# Patient Record
Sex: FEMALE | Race: WHITE | NOT HISPANIC OR LATINO | Employment: FULL TIME | URBAN - METROPOLITAN AREA
[De-identification: names, ages, dates, MRNs, and addresses within clinical notes are randomized per-mention and may not be internally consistent; named-entity substitution may affect disease eponyms.]

---

## 2017-06-08 ENCOUNTER — GENERIC CONVERSION - ENCOUNTER (OUTPATIENT)
Dept: OTHER | Facility: OTHER | Age: 37
End: 2017-06-08

## 2017-06-08 ENCOUNTER — ALLSCRIPTS OFFICE VISIT (OUTPATIENT)
Dept: OTHER | Facility: OTHER | Age: 37
End: 2017-06-08

## 2017-06-08 LAB — S PYO AG THROAT QL: NEGATIVE

## 2018-01-15 VITALS
SYSTOLIC BLOOD PRESSURE: 94 MMHG | DIASTOLIC BLOOD PRESSURE: 72 MMHG | WEIGHT: 114 LBS | BODY MASS INDEX: 18.99 KG/M2 | HEIGHT: 65 IN | RESPIRATION RATE: 16 BRPM | TEMPERATURE: 99.6 F | HEART RATE: 88 BPM

## 2018-01-15 NOTE — RESULT NOTES
Verified Results  Rapid Pia Keegan- POC [id number] 10:39AM Hortensia Sandoval     Test Name Result Flag Reference   Rapid Strep Negative

## 2018-01-16 NOTE — MISCELLANEOUS
History of Present Illness  TCM Communication [facility] Diana [facility]: She was hospitalized at [facility]  The date of discharge:, September 26, 2016  Diagnosis: Pre-term contractions  She was discharged to home  She did not schedule a follow up appointment  Follow-up appointments with other specialists: Will f/u with ob/gyn  Counseling was provided to the patient  Topics counseled included importance of compliance with treatment  Communication performed and completed by Berry Lazaro      Active Problems    1  Abdominal pain (789 00) (R10 9)   2  Abdominal pain, acute, right lower quadrant (789 03,338 19) (R10 31)   3  Abrasion Or Friction Burn (919 0)   4  Acute bronchitis (466 0) (J20 9)   5  Acute upper respiratory infection (465 9) (J06 9)   6  Allergic rhinitis (477 9) (J30 9)   7  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)   8  History of allergy (V15 09) (Z88 9)   9  Ingrowing nail with infection (703 0) (L60 0)   10  Insomnia (780 52) (G47 00)   11  Lower back pain (724 2) (M54 5)   12  Lumbar radiculopathy (724 4) (M54 16)   13  Nasal pain (478 19) (J34 89)   14  Reactive airway disease (493 90) (J45 909)   15  Skin lesion (709 9) (L98 9)    Past Medical History    1  History of Denial Of Any Significant Medical History    Surgical History    1  History of Myringotomy    Family History  Father    1  Family history of Coronary Artery Disease (V18 0)  Family History    2  Denied: Family history of Breast Cancer   3  Denied: Family history of Colon Cancer   4  Denied: Family history of Diabetes Mellitus   5  Denied: Family history of Stroke Syndrome    Social History    · Being A Social Drinker   · Caffeine Use   · Never A Smoker    Current Meds   1  Guaifenesin-Codeine 100-10 MG/5ML SYRP; TAKE 5 - 10 ML EVERY 4 TO 6 HOURS AS   NEEDED FOR COUGH; Therapy: 70Fsd3253 to (Evaluate:89Xko5132); Last Rx:[id number] Ordered   2  Levonorgestrel-Ethinyl Estrad 0 15-30 MG-MCG Oral Tablet; TAKE 1 TABLET DAILY;    Therapy: (Kiki Ast) to Recorded   3  Zithromax Z-Leo 250 MG Oral Tablet; TAKE 2 TABLETS ON DAY 1 THEN TAKE 1 TABLET   A DAY FOR 4 DAYS; Therapy: 66Vbt0326 to (Last Rx:[id number])  Requested for: 82Ocy8367 Ordered    Allergies    1   No Known Drug Allergies    Signatures   Electronically signed by : Sj Ashraf DO; Oct  7 2016  3:26PM EST                       (Author)

## 2018-01-18 NOTE — MISCELLANEOUS
History of Present Illness  TCM Communication St Rene Fang: She was hospitalized at [facility]  The date of discharge:, October 17, 2016  Diagnosis: Premature rupture of membranes/pregnancy  She was discharged to home  She did not schedule a follow up appointment  Follow-up appointments with other specialists: will f/u with ob/gyn  Counseling was provided to the patient  Topics counseled included importance of compliance with treatment  Communication performed and completed by Tonie Cabrerao      Active Problems   1  Abdominal pain (789 00) (R10 9)  2  Abdominal pain, acute, right lower quadrant (789 03,338 19) (R10 31)  3  Abrasion Or Friction Burn (919 0)  4  Acute bronchitis (466 0) (J20 9)  5  Acute upper respiratory infection (465 9) (J06 9)  6  Allergic rhinitis (477 9) (J30 9)  7  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)  8  History of allergy (V15 09) (Z88 9)  9  Ingrowing nail with infection (703 0) (L60 0)  10  Insomnia (780 52) (G47 00)  11  Lower back pain (724 2) (M54 5)  12  Lumbar radiculopathy (724 4) (M54 16)  13  Nasal pain (478 19) (J34 89)  14  Reactive airway disease (493 90) (J45 909)  15  Skin lesion (709 9) (L98 9)    Past Medical History   1  History of Denial Of Any Significant Medical History    Surgical History   1  History of Myringotomy    Family History  Father   1  Family history of Coronary Artery Disease (V18 0)  Family History   2  Denied: Family history of Breast Cancer  3  Denied: Family history of Colon Cancer  4  Denied: Family history of Diabetes Mellitus  5  Denied: Family history of Stroke Syndrome    Social History    · Being A Social Drinker   · Caffeine Use   · Never A Smoker    Current Meds  1  Guaifenesin-Codeine 100-10 MG/5ML SYRP; TAKE 5 - 10 ML EVERY 4 TO 6 HOURS AS   NEEDED FOR COUGH; Therapy: 01Kvk3619 to (Evaluate:12Fsh1149); Last Rx:[id number] Ordered  2  Levonorgestrel-Ethinyl Estrad 0 15-30 MG-MCG Oral Tablet; TAKE 1 TABLET DAILY;    Therapy: (Roxine Rily) to Recorded  3  Zithromax Z-Leo 250 MG Oral Tablet; TAKE 2 TABLETS ON DAY 1 THEN TAKE 1 TABLET   A DAY FOR 4 DAYS; Therapy: 09Rha1682 to (Last Rx:27Zuc4670)  Requested for: 16Lfa7602 Ordered    Allergies   1   No Known Drug Allergies    Signatures   Electronically signed by : Rondel Baumgarten, DO; Oct 26 2016  2:27PM EST                       (Author)    Electronically signed by : Rondel Baumgarten, DO; Oct 26 2016  2:27PM EST                       (Author)

## 2018-05-11 ENCOUNTER — OFFICE VISIT (OUTPATIENT)
Dept: FAMILY MEDICINE CLINIC | Facility: CLINIC | Age: 38
End: 2018-05-11
Payer: COMMERCIAL

## 2018-05-11 VITALS
RESPIRATION RATE: 16 BRPM | SYSTOLIC BLOOD PRESSURE: 110 MMHG | TEMPERATURE: 98.7 F | BODY MASS INDEX: 19.99 KG/M2 | HEIGHT: 65 IN | WEIGHT: 120 LBS | HEART RATE: 68 BPM | DIASTOLIC BLOOD PRESSURE: 64 MMHG

## 2018-05-11 DIAGNOSIS — G47.00 INSOMNIA, UNSPECIFIED TYPE: Primary | ICD-10-CM

## 2018-05-11 PROCEDURE — 3008F BODY MASS INDEX DOCD: CPT | Performed by: FAMILY MEDICINE

## 2018-05-11 PROCEDURE — 99213 OFFICE O/P EST LOW 20 MIN: CPT | Performed by: FAMILY MEDICINE

## 2018-05-11 RX ORDER — ZOLPIDEM TARTRATE 10 MG/1
10 TABLET ORAL
Qty: 30 TABLET | Refills: 2 | Status: SHIPPED | OUTPATIENT
Start: 2018-05-11 | End: 2018-08-14 | Stop reason: SDUPTHER

## 2018-05-11 RX ORDER — LEVONORGESTREL AND ETHINYL ESTRADIOL 0.15-0.03
1 KIT ORAL
COMMUNITY
Start: 2018-04-30 | End: 2020-11-25 | Stop reason: ALTCHOICE

## 2018-05-11 NOTE — PROGRESS NOTES
Assessment/Plan:    Discussed treatment options with patient and patient prefers to continue with Ambien  Prescription written for Ambien 10 mg q h s  p r n     Discussed proper sleep hygiene  Avoid caffeine after noon  Recommend regular exercise but not within a couple hours prior to going to bed  Recommend patient avoid cell phone and computer for couple hours before going to bed  Return to the office p r n  Mariah Leroy Diagnoses and all orders for this visit:    Insomnia, unspecified type  Comments:  Zolpidem 10 mg q h s  p r n     Avoid caffeine after noon  Recommend regular exercise  Discussed proper sleep hygiene  Orders:  -     zolpidem (AMBIEN) 10 mg tablet; Take 1 tablet (10 mg total) by mouth daily at bedtime as needed for sleep    Other orders  -     levonorgestrel-ethinyl estradiol (SEASONALE) 0 15-0 03 MG per tablet; Take 1 tablet by mouth          Subjective:      Patient ID: Nasim Jarvis is a 40 y o  female  Patient complains of difficulty sleeping chronically for years  She complains of difficulty falling asleep and staying asleep  Patient admits to increased stress in her life with working, caring for 4 children and planning a wedding  Patient drinks 1 cup of coffee in the morning only  No regular exercise program   Patient has tried several over-the-counter sleep aids including melatonin, Tylenol p m  and Advil p m  without improvement  Patient has taken her mother's Ambien on occasion with good results and denies side effects          The following portions of the patient's history were reviewed and updated as appropriate: allergies, current medications, past family history, past medical history, past social history, past surgical history and problem list     Review of Systems      Objective:      /64   Pulse 68   Temp 98 7 °F (37 1 °C)   Resp 16   Ht 5' 5" (1 651 m)   Wt 54 4 kg (120 lb)   BMI 19 97 kg/m²          Physical Exam   Constitutional: She is oriented to person, place, and time  She appears well-developed and well-nourished  No distress  HENT:   Head: Normocephalic  Mouth/Throat: Oropharynx is clear and moist    Eyes: Conjunctivae are normal  No scleral icterus  Neck: Neck supple  No thyromegaly present  Cardiovascular: Normal rate and regular rhythm  Pulmonary/Chest: Effort normal and breath sounds normal    Abdominal: Soft  There is no tenderness  Musculoskeletal: She exhibits no edema  Lymphadenopathy:     She has no cervical adenopathy  Neurological: She is alert and oriented to person, place, and time  Skin: Skin is warm and dry  Psychiatric: She has a normal mood and affect

## 2018-08-14 DIAGNOSIS — G47.00 INSOMNIA, UNSPECIFIED TYPE: ICD-10-CM

## 2018-08-14 RX ORDER — ZOLPIDEM TARTRATE 10 MG/1
10 TABLET ORAL
Qty: 30 TABLET | Refills: 0 | Status: SHIPPED | OUTPATIENT
Start: 2018-08-14 | End: 2018-09-13 | Stop reason: SDUPTHER

## 2018-08-17 NOTE — TELEPHONE ENCOUNTER
Spoke with Carlos at [facility], called in prescription and put printed copy in shred

## 2018-09-04 ENCOUNTER — OFFICE VISIT (OUTPATIENT)
Dept: FAMILY MEDICINE CLINIC | Facility: CLINIC | Age: 38
End: 2018-09-04
Payer: COMMERCIAL

## 2018-09-04 VITALS
DIASTOLIC BLOOD PRESSURE: 60 MMHG | TEMPERATURE: 98.8 F | RESPIRATION RATE: 16 BRPM | HEART RATE: 72 BPM | BODY MASS INDEX: 19.97 KG/M2 | WEIGHT: 120 LBS | SYSTOLIC BLOOD PRESSURE: 104 MMHG

## 2018-09-04 DIAGNOSIS — R23.8 EASY BRUISING: Primary | ICD-10-CM

## 2018-09-04 LAB
ERYTHROCYTE [DISTWIDTH] IN BLOOD BY AUTOMATED COUNT: 12 % (ref 11.6–15.1)
HCT VFR BLD AUTO: 40.3 % (ref 34.8–46.1)
HGB BLD-MCNC: 13.7 G/DL (ref 11.5–15.4)
INR PPP: 0.9 (ref 0.86–1.17)
MCH RBC QN AUTO: 32.9 PG (ref 26.8–34.3)
MCHC RBC AUTO-ENTMCNC: 34 G/DL (ref 31.4–37.4)
MCV RBC AUTO: 97 FL (ref 82–98)
PLATELET # BLD AUTO: 210 THOUSANDS/UL (ref 149–390)
PMV BLD AUTO: 10.8 FL (ref 8.9–12.7)
PROTHROMBIN TIME: 12.3 SECONDS (ref 11.8–14.2)
RBC # BLD AUTO: 4.16 MILLION/UL (ref 3.81–5.12)
WBC # BLD AUTO: 8.09 THOUSAND/UL (ref 4.31–10.16)

## 2018-09-04 PROCEDURE — 85610 PROTHROMBIN TIME: CPT | Performed by: FAMILY MEDICINE

## 2018-09-04 PROCEDURE — 85027 COMPLETE CBC AUTOMATED: CPT | Performed by: FAMILY MEDICINE

## 2018-09-04 PROCEDURE — 1036F TOBACCO NON-USER: CPT | Performed by: FAMILY MEDICINE

## 2018-09-04 PROCEDURE — 99213 OFFICE O/P EST LOW 20 MIN: CPT | Performed by: FAMILY MEDICINE

## 2018-09-04 PROCEDURE — 36415 COLL VENOUS BLD VENIPUNCTURE: CPT | Performed by: FAMILY MEDICINE

## 2018-09-13 DIAGNOSIS — G47.00 INSOMNIA, UNSPECIFIED TYPE: ICD-10-CM

## 2018-09-13 RX ORDER — ZOLPIDEM TARTRATE 10 MG/1
10 TABLET ORAL
Qty: 30 TABLET | Refills: 2 | Status: SHIPPED | OUTPATIENT
Start: 2018-09-13 | End: 2018-12-28 | Stop reason: SDUPTHER

## 2018-12-28 DIAGNOSIS — G47.00 INSOMNIA, UNSPECIFIED TYPE: ICD-10-CM

## 2018-12-28 RX ORDER — ZOLPIDEM TARTRATE 10 MG/1
10 TABLET ORAL
Qty: 30 TABLET | Refills: 0 | OUTPATIENT
Start: 2018-12-28 | End: 2019-02-01 | Stop reason: SDUPTHER

## 2019-02-01 DIAGNOSIS — G47.00 INSOMNIA, UNSPECIFIED TYPE: ICD-10-CM

## 2019-02-01 RX ORDER — ZOLPIDEM TARTRATE 10 MG/1
10 TABLET ORAL
Qty: 30 TABLET | Refills: 0 | Status: SHIPPED | OUTPATIENT
Start: 2019-02-01 | End: 2019-02-28 | Stop reason: SDUPTHER

## 2019-02-07 ENCOUNTER — OFFICE VISIT (OUTPATIENT)
Dept: FAMILY MEDICINE CLINIC | Facility: CLINIC | Age: 39
End: 2019-02-07
Payer: COMMERCIAL

## 2019-02-07 VITALS
BODY MASS INDEX: 19.99 KG/M2 | DIASTOLIC BLOOD PRESSURE: 80 MMHG | SYSTOLIC BLOOD PRESSURE: 130 MMHG | WEIGHT: 120 LBS | HEIGHT: 65 IN | TEMPERATURE: 98.9 F | RESPIRATION RATE: 16 BRPM | HEART RATE: 76 BPM

## 2019-02-07 DIAGNOSIS — R10.11 RIGHT UPPER QUADRANT ABDOMINAL PAIN: Primary | ICD-10-CM

## 2019-02-07 PROBLEM — K64.1 SECOND DEGREE HEMORRHOIDS: Status: ACTIVE | Noted: 2018-11-02

## 2019-02-07 LAB
ALBUMIN SERPL BCP-MCNC: 3.6 G/DL (ref 3.5–5)
ALP SERPL-CCNC: 28 U/L (ref 46–116)
ALT SERPL W P-5'-P-CCNC: 24 U/L (ref 12–78)
AMYLASE SERPL-CCNC: 47 IU/L (ref 25–115)
ANION GAP SERPL CALCULATED.3IONS-SCNC: 6 MMOL/L (ref 4–13)
AST SERPL W P-5'-P-CCNC: 14 U/L (ref 5–45)
BASOPHILS # BLD AUTO: 0.02 THOUSANDS/ΜL (ref 0–0.1)
BASOPHILS NFR BLD AUTO: 0 % (ref 0–1)
BILIRUB SERPL-MCNC: 0.44 MG/DL (ref 0.2–1)
BUN SERPL-MCNC: 20 MG/DL (ref 5–25)
CALCIUM SERPL-MCNC: 8.8 MG/DL (ref 8.3–10.1)
CHLORIDE SERPL-SCNC: 107 MMOL/L (ref 100–108)
CO2 SERPL-SCNC: 28 MMOL/L (ref 21–32)
CREAT SERPL-MCNC: 0.86 MG/DL (ref 0.6–1.3)
EOSINOPHIL # BLD AUTO: 0.14 THOUSAND/ΜL (ref 0–0.61)
EOSINOPHIL NFR BLD AUTO: 2 % (ref 0–6)
ERYTHROCYTE [DISTWIDTH] IN BLOOD BY AUTOMATED COUNT: 11.8 % (ref 11.6–15.1)
GFR SERPL CREATININE-BSD FRML MDRD: 87 ML/MIN/1.73SQ M
GLUCOSE P FAST SERPL-MCNC: 82 MG/DL (ref 65–99)
HCT VFR BLD AUTO: 41.7 % (ref 34.8–46.1)
HGB BLD-MCNC: 13.7 G/DL (ref 11.5–15.4)
IMM GRANULOCYTES # BLD AUTO: 0.02 THOUSAND/UL (ref 0–0.2)
IMM GRANULOCYTES NFR BLD AUTO: 0 % (ref 0–2)
LIPASE SERPL-CCNC: 114 U/L (ref 73–393)
LYMPHOCYTES # BLD AUTO: 2.11 THOUSANDS/ΜL (ref 0.6–4.47)
LYMPHOCYTES NFR BLD AUTO: 27 % (ref 14–44)
MCH RBC QN AUTO: 31.9 PG (ref 26.8–34.3)
MCHC RBC AUTO-ENTMCNC: 32.9 G/DL (ref 31.4–37.4)
MCV RBC AUTO: 97 FL (ref 82–98)
MONOCYTES # BLD AUTO: 0.49 THOUSAND/ΜL (ref 0.17–1.22)
MONOCYTES NFR BLD AUTO: 6 % (ref 4–12)
NEUTROPHILS # BLD AUTO: 4.99 THOUSANDS/ΜL (ref 1.85–7.62)
NEUTS SEG NFR BLD AUTO: 65 % (ref 43–75)
NRBC BLD AUTO-RTO: 0 /100 WBCS
PLATELET # BLD AUTO: 238 THOUSANDS/UL (ref 149–390)
PMV BLD AUTO: 10.6 FL (ref 8.9–12.7)
POTASSIUM SERPL-SCNC: 4.1 MMOL/L (ref 3.5–5.3)
PROT SERPL-MCNC: 6.8 G/DL (ref 6.4–8.2)
RBC # BLD AUTO: 4.29 MILLION/UL (ref 3.81–5.12)
SODIUM SERPL-SCNC: 141 MMOL/L (ref 136–145)
WBC # BLD AUTO: 7.77 THOUSAND/UL (ref 4.31–10.16)

## 2019-02-07 PROCEDURE — 3008F BODY MASS INDEX DOCD: CPT | Performed by: FAMILY MEDICINE

## 2019-02-07 PROCEDURE — 1036F TOBACCO NON-USER: CPT | Performed by: FAMILY MEDICINE

## 2019-02-07 PROCEDURE — 99214 OFFICE O/P EST MOD 30 MIN: CPT | Performed by: FAMILY MEDICINE

## 2019-02-07 PROCEDURE — 85025 COMPLETE CBC W/AUTO DIFF WBC: CPT | Performed by: FAMILY MEDICINE

## 2019-02-07 PROCEDURE — 82150 ASSAY OF AMYLASE: CPT | Performed by: FAMILY MEDICINE

## 2019-02-07 PROCEDURE — 36415 COLL VENOUS BLD VENIPUNCTURE: CPT | Performed by: FAMILY MEDICINE

## 2019-02-07 PROCEDURE — 83690 ASSAY OF LIPASE: CPT | Performed by: FAMILY MEDICINE

## 2019-02-07 PROCEDURE — 80053 COMPREHEN METABOLIC PANEL: CPT | Performed by: FAMILY MEDICINE

## 2019-02-07 NOTE — PROGRESS NOTES
Assessment/Plan:  Labs drawn for CBC, CMP, amylase and lipase  Patient will be scheduled for abdominal ultrasound  Will heed results  Patient may take Tylenol or Motrin natalia Guardado She is encouraged to drink plenty of fluids and follow a bland diet  Patient to follow up with Dr Timothy Coe, plastic surgeon next week as scheduled and return to the office in 1 week or sooner natalia Guardado Diagnoses and all orders for this visit:    Right upper quadrant abdominal pain  Comments:  CBC, CMP, amylase and lipase  Schedule abdominal ultrasound  Orders:  -     CBC and differential  -     Comprehensive metabolic panel  -     Amylase  -     Lipase  -     US abdomen limited; Future          Subjective:      Patient ID: Jitendra Ervin is a 41 y o  female  Patient complains of intermittent sharp right upper quadrant abdominal pains for the past 4 days  Pain usually starts at 1 or 2 o'clock in the afternoon and persist throughout the evening and occasionally awakening patient at night from her sleep  Abdominal pain not associated with meals  Appetite remains good and patient denies nausea or vomiting and denies indigestion or heartburn  No change in bowel movements  Patient denies melena or hematochezia  Negative  symptoms  Patient denies fever  Patient is status post breast lift surgery 2 weeks ago with Dr Timothy Coe, plastic surgeon and has a follow-up appointment with him next week  Patient has treated this with Motrin and narcotic pain medication with some relief  Abdominal Pain   This is a new problem  The current episode started in the past 7 days  The problem occurs intermittently  The problem has been unchanged  The pain is located in the RUQ  The quality of the pain is sharp  The abdominal pain radiates to the back  Pertinent negatives include no anorexia, belching, constipation, diarrhea, dysuria, fever, flatus, frequency, hematochezia, hematuria, melena, nausea, vomiting or weight loss   Nothing aggravates the pain  The pain is relieved by nothing  She has tried oral narcotic analgesics (motrin) for the symptoms  The treatment provided mild relief  There is no history of abdominal surgery, gallstones, GERD or PUD  The following portions of the patient's history were reviewed and updated as appropriate: allergies, current medications, past family history, past medical history, past social history, past surgical history and problem list     Review of Systems   Constitutional: Negative for fever and weight loss  Gastrointestinal: Positive for abdominal pain  Negative for anorexia, constipation, diarrhea, flatus, hematochezia, melena, nausea and vomiting  Genitourinary: Negative for dysuria, frequency and hematuria  Objective:      /80   Pulse 76   Temp 98 9 °F (37 2 °C) (Oral)   Resp 16   Ht 5' 5" (1 651 m)   Wt 54 4 kg (120 lb)   BMI 19 97 kg/m²          Physical Exam   Constitutional: She is oriented to person, place, and time  She appears well-developed and well-nourished  No distress  HENT:   Head: Normocephalic  Mouth/Throat: Oropharynx is clear and moist    Eyes: Conjunctivae are normal  No scleral icterus  Neck: Neck supple  Cardiovascular: Normal rate and regular rhythm  Pulmonary/Chest: Effort normal and breath sounds normal    Abdominal: Soft  Bowel sounds are normal  There is tenderness  There is no rebound and no guarding  Positive right upper quadrant tenderness to deep palpation  Musculoskeletal: She exhibits no edema  Lymphadenopathy:     She has no cervical adenopathy  Neurological: She is alert and oriented to person, place, and time  Skin: Skin is warm and dry  Psychiatric: She has a normal mood and affect

## 2019-02-11 ENCOUNTER — HOSPITAL ENCOUNTER (OUTPATIENT)
Dept: ULTRASOUND IMAGING | Facility: MEDICAL CENTER | Age: 39
Discharge: HOME | End: 2019-02-11
Payer: COMMERCIAL

## 2019-02-11 DIAGNOSIS — R10.11 RIGHT UPPER QUADRANT ABDOMINAL PAIN: ICD-10-CM

## 2019-02-11 PROCEDURE — 76705 ECHO EXAM OF ABDOMEN: CPT

## 2019-02-28 DIAGNOSIS — G47.00 INSOMNIA, UNSPECIFIED TYPE: ICD-10-CM

## 2019-02-28 RX ORDER — ZOLPIDEM TARTRATE 10 MG/1
10 TABLET ORAL
Qty: 30 TABLET | Refills: 1 | OUTPATIENT
Start: 2019-02-28 | End: 2019-04-26 | Stop reason: SDUPTHER

## 2019-04-26 DIAGNOSIS — G47.00 INSOMNIA, UNSPECIFIED TYPE: ICD-10-CM

## 2019-04-26 RX ORDER — ZOLPIDEM TARTRATE 10 MG/1
10 TABLET ORAL
Qty: 30 TABLET | Refills: 1 | Status: SHIPPED | OUTPATIENT
Start: 2019-04-26 | End: 2019-08-08 | Stop reason: SDUPTHER

## 2019-08-08 DIAGNOSIS — G47.00 INSOMNIA, UNSPECIFIED TYPE: ICD-10-CM

## 2019-08-08 RX ORDER — ZOLPIDEM TARTRATE 10 MG/1
10 TABLET ORAL
Qty: 30 TABLET | Refills: 1 | Status: SHIPPED | OUTPATIENT
Start: 2019-08-08 | End: 2019-10-08 | Stop reason: SDUPTHER

## 2019-09-16 ENCOUNTER — VBI (OUTPATIENT)
Dept: ADMINISTRATIVE | Facility: OTHER | Age: 39
End: 2019-09-16

## 2019-09-16 NOTE — TELEPHONE ENCOUNTER
Eryn Mcbride    ED Visit Information     Ed visit date: 8/24/19- [facility] report date 9/11/19  Diagnosis Description: INSECT BITE (NONVENOMOUS), LEFT LOWER LEG, INITIAL ENCOUNTER  In Network? No  Discharge status: Home  Discharged with meds ? No  Number of ED visits to date: 1  ED Severity:3     Outreach Information    Outreach successful: Yes 1    letter mailed:na  Date [facility] Coordination    Follow up appointment with pcp: no declined  Transportation issues ? No    [employer] type:  7 Day Outreach  Emergent necessity warranted by diagnosis:  Yes  [facility] PCP:  Yes  Transportation:  Friend/Family Transport  Called PCP first?:  Yes  Told to go to ED by PCP?:  No  Same-Day or Next Day Appointment Offered?:  No  Would have used same-day or next-day if offered?:  Yes  Feels able to call PCP for urgent problems ?:  Yes  Understands what emergencies can be handled by PCP ?:  Yes  Ever any problems getting appointment with PCP for minor emergency/urgency problems?:  No  Practice Contacted Patient ?:  No  Pt had ED follow up with pcp/staff ?:  No    Reason Pt went out of  network ?:  proximty  Urgent care Education?:  No  09/16/2019 11:32 AM Phone ([employer]) [phone], Marcos Phan (Self) [phone] (M) Remove  Call Complete    I spoke to Christal Head today she states she is doing fine declined follow up with PCP  She stated she went to [facility] due to proximity of where the injury occurred  She is aware of St urgent care location nearest her   [facility]  - [facility])  She has used them in the past

## 2019-10-08 DIAGNOSIS — G47.00 INSOMNIA, UNSPECIFIED TYPE: ICD-10-CM

## 2019-10-08 RX ORDER — ZOLPIDEM TARTRATE 10 MG/1
10 TABLET ORAL
Qty: 30 TABLET | Refills: 1 | Status: SHIPPED | OUTPATIENT
Start: 2019-10-08 | End: 2020-01-31 | Stop reason: SDUPTHER

## 2020-01-31 DIAGNOSIS — G47.00 INSOMNIA, UNSPECIFIED TYPE: ICD-10-CM

## 2020-01-31 RX ORDER — ZOLPIDEM TARTRATE 10 MG/1
10 TABLET ORAL
Qty: 30 TABLET | Refills: 1 | Status: SHIPPED | OUTPATIENT
Start: 2020-01-31 | End: 2020-03-31

## 2020-03-31 DIAGNOSIS — G47.00 INSOMNIA, UNSPECIFIED TYPE: ICD-10-CM

## 2020-03-31 RX ORDER — ZOLPIDEM TARTRATE 10 MG/1
TABLET ORAL
Qty: 30 TABLET | Refills: 1 | Status: SHIPPED | OUTPATIENT
Start: 2020-03-31 | End: 2020-05-28 | Stop reason: SDUPTHER

## 2020-04-10 ENCOUNTER — TELEMEDICINE (OUTPATIENT)
Dept: FAMILY MEDICINE CLINIC | Facility: CLINIC | Age: 40
End: 2020-04-10
Payer: COMMERCIAL

## 2020-04-10 DIAGNOSIS — J02.9 PHARYNGITIS, UNSPECIFIED ETIOLOGY: ICD-10-CM

## 2020-04-10 DIAGNOSIS — J06.9 UPPER RESPIRATORY TRACT INFECTION, UNSPECIFIED TYPE: Primary | ICD-10-CM

## 2020-04-10 PROCEDURE — 99213 OFFICE O/P EST LOW 20 MIN: CPT | Performed by: FAMILY MEDICINE

## 2020-04-10 RX ORDER — AZITHROMYCIN 250 MG/1
TABLET, FILM COATED ORAL
Qty: 6 TABLET | Refills: 0 | Status: SHIPPED | OUTPATIENT
Start: 2020-04-10 | End: 2020-04-14

## 2020-05-28 DIAGNOSIS — G47.00 INSOMNIA, UNSPECIFIED TYPE: ICD-10-CM

## 2020-05-28 RX ORDER — ZOLPIDEM TARTRATE 10 MG/1
10 TABLET ORAL
Qty: 30 TABLET | Refills: 1 | Status: SHIPPED | OUTPATIENT
Start: 2020-05-28 | End: 2020-08-14 | Stop reason: SDUPTHER

## 2020-05-30 DIAGNOSIS — B34.9 VIRAL DISEASE: ICD-10-CM

## 2020-05-30 PROCEDURE — U0003 INFECTIOUS AGENT DETECTION BY NUCLEIC ACID (DNA OR RNA); SEVERE ACUTE RESPIRATORY SYNDROME CORONAVIRUS 2 (SARS-COV-2) (CORONAVIRUS DISEASE [COVID-19]), AMPLIFIED PROBE TECHNIQUE, MAKING USE OF HIGH THROUGHPUT TECHNOLOGIES AS DESCRIBED BY CMS-2020-01-R: HCPCS

## 2020-06-02 ENCOUNTER — ANESTHESIA EVENT (OUTPATIENT)
Dept: PERIOP | Facility: HOSPITAL | Age: 40
End: 2020-06-02
Payer: SELF-PAY

## 2020-06-02 LAB — SARS-COV-2 RNA SPEC QL NAA+PROBE: NOT DETECTED

## 2020-06-05 ENCOUNTER — HOSPITAL ENCOUNTER (OUTPATIENT)
Facility: HOSPITAL | Age: 40
Setting detail: OUTPATIENT SURGERY
Discharge: HOME | End: 2020-06-05
Attending: SURGERY | Admitting: SURGERY
Payer: SELF-PAY

## 2020-06-05 ENCOUNTER — ANESTHESIA (OUTPATIENT)
Dept: PERIOP | Facility: HOSPITAL | Age: 40
End: 2020-06-05
Payer: SELF-PAY

## 2020-06-05 VITALS
OXYGEN SATURATION: 98 % | SYSTOLIC BLOOD PRESSURE: 113 MMHG | WEIGHT: 120 LBS | DIASTOLIC BLOOD PRESSURE: 68 MMHG | TEMPERATURE: 98 F | BODY MASS INDEX: 19.97 KG/M2 | RESPIRATION RATE: 18 BRPM | HEART RATE: 78 BPM

## 2020-06-05 DIAGNOSIS — B34.9 VIRAL DISEASE: Primary | ICD-10-CM

## 2020-06-05 PROBLEM — N64.81 PTOSIS OF BOTH BREASTS: Status: ACTIVE | Noted: 2020-06-05

## 2020-06-05 LAB
EXT PREGNANCY TEST URINE: NEGATIVE
EXT. CONTROL: NORMAL

## 2020-06-05 PROCEDURE — 81025 URINE PREGNANCY TEST: CPT | Performed by: SURGERY

## 2020-06-05 RX ORDER — HYDROCODONE BITARTRATE AND ACETAMINOPHEN 5; 325 MG/1; MG/1
2 TABLET ORAL EVERY 4 HOURS PRN
Status: DISCONTINUED | OUTPATIENT
Start: 2020-06-05 | End: 2020-06-05 | Stop reason: HOSPADM

## 2020-06-05 RX ORDER — FENTANYL CITRATE/PF 50 MCG/ML
50 SYRINGE (ML) INJECTION
Status: DISCONTINUED | OUTPATIENT
Start: 2020-06-05 | End: 2020-06-05

## 2020-06-05 RX ORDER — HYDROCODONE BITARTRATE AND ACETAMINOPHEN 5; 325 MG/1; MG/1
1 TABLET ORAL EVERY 4 HOURS PRN
Status: DISCONTINUED | OUTPATIENT
Start: 2020-06-05 | End: 2020-06-05 | Stop reason: HOSPADM

## 2020-06-05 RX ORDER — ALBUTEROL SULFATE 2.5 MG/3ML
2.5 SOLUTION RESPIRATORY (INHALATION) ONCE AS NEEDED
Status: DISCONTINUED | OUTPATIENT
Start: 2020-06-05 | End: 2020-06-05

## 2020-06-05 RX ORDER — MAGNESIUM HYDROXIDE 1200 MG/15ML
LIQUID ORAL AS NEEDED
Status: DISCONTINUED | OUTPATIENT
Start: 2020-06-05 | End: 2020-06-05 | Stop reason: HOSPADM

## 2020-06-05 RX ORDER — FENTANYL CITRATE/PF 50 MCG/ML
25 SYRINGE (ML) INJECTION
Status: DISCONTINUED | OUTPATIENT
Start: 2020-06-05 | End: 2020-06-05 | Stop reason: HOSPADM

## 2020-06-05 RX ORDER — DEXAMETHASONE SODIUM PHOSPHATE 4 MG/ML
4 INJECTION, SOLUTION INTRA-ARTICULAR; INTRALESIONAL; INTRAMUSCULAR; INTRAVENOUS; SOFT TISSUE ONCE AS NEEDED
Status: DISCONTINUED | OUTPATIENT
Start: 2020-06-05 | End: 2020-06-05 | Stop reason: HOSPADM

## 2020-06-05 RX ORDER — KETOROLAC TROMETHAMINE 30 MG/ML
INJECTION, SOLUTION INTRAMUSCULAR; INTRAVENOUS AS NEEDED
Status: DISCONTINUED | OUTPATIENT
Start: 2020-06-05 | End: 2020-06-05

## 2020-06-05 RX ORDER — KETOROLAC TROMETHAMINE 30 MG/ML
INJECTION, SOLUTION INTRAMUSCULAR; INTRAVENOUS AS NEEDED
Status: DISCONTINUED | OUTPATIENT
Start: 2020-06-05 | End: 2020-06-05 | Stop reason: SURG

## 2020-06-05 RX ORDER — LIDOCAINE HYDROCHLORIDE 10 MG/ML
INJECTION, SOLUTION EPIDURAL; INFILTRATION; INTRACAUDAL; PERINEURAL AS NEEDED
Status: DISCONTINUED | OUTPATIENT
Start: 2020-06-05 | End: 2020-06-05 | Stop reason: SURG

## 2020-06-05 RX ORDER — ONDANSETRON 2 MG/ML
4 INJECTION INTRAMUSCULAR; INTRAVENOUS ONCE AS NEEDED
Status: DISCONTINUED | OUTPATIENT
Start: 2020-06-05 | End: 2020-06-05

## 2020-06-05 RX ORDER — CEFAZOLIN SODIUM 1 G/50ML
1000 SOLUTION INTRAVENOUS ONCE
Status: COMPLETED | OUTPATIENT
Start: 2020-06-05 | End: 2020-06-05

## 2020-06-05 RX ORDER — DIPHENHYDRAMINE HYDROCHLORIDE 50 MG/ML
12.5 INJECTION INTRAMUSCULAR; INTRAVENOUS ONCE AS NEEDED
Status: DISCONTINUED | OUTPATIENT
Start: 2020-06-05 | End: 2020-06-05 | Stop reason: HOSPADM

## 2020-06-05 RX ORDER — FENTANYL CITRATE 50 UG/ML
INJECTION, SOLUTION INTRAMUSCULAR; INTRAVENOUS AS NEEDED
Status: DISCONTINUED | OUTPATIENT
Start: 2020-06-05 | End: 2020-06-05 | Stop reason: SURG

## 2020-06-05 RX ORDER — MEPERIDINE HYDROCHLORIDE 25 MG/ML
12.5 INJECTION INTRAMUSCULAR; INTRAVENOUS; SUBCUTANEOUS ONCE AS NEEDED
Status: DISCONTINUED | OUTPATIENT
Start: 2020-06-05 | End: 2020-06-05

## 2020-06-05 RX ORDER — FENTANYL CITRATE/PF 50 MCG/ML
12.5 SYRINGE (ML) INJECTION
Status: DISCONTINUED | OUTPATIENT
Start: 2020-06-05 | End: 2020-06-05 | Stop reason: HOSPADM

## 2020-06-05 RX ORDER — ONDANSETRON 2 MG/ML
INJECTION INTRAMUSCULAR; INTRAVENOUS AS NEEDED
Status: DISCONTINUED | OUTPATIENT
Start: 2020-06-05 | End: 2020-06-05 | Stop reason: SURG

## 2020-06-05 RX ORDER — EPHEDRINE SULFATE 50 MG/ML
INJECTION INTRAVENOUS AS NEEDED
Status: DISCONTINUED | OUTPATIENT
Start: 2020-06-05 | End: 2020-06-05 | Stop reason: SURG

## 2020-06-05 RX ORDER — KETOROLAC TROMETHAMINE 30 MG/ML
30 INJECTION, SOLUTION INTRAMUSCULAR; INTRAVENOUS ONCE
Status: DISCONTINUED | OUTPATIENT
Start: 2020-06-05 | End: 2020-06-05 | Stop reason: HOSPADM

## 2020-06-05 RX ORDER — SODIUM CHLORIDE, SODIUM LACTATE, POTASSIUM CHLORIDE, CALCIUM CHLORIDE 600; 310; 30; 20 MG/100ML; MG/100ML; MG/100ML; MG/100ML
INJECTION, SOLUTION INTRAVENOUS CONTINUOUS PRN
Status: DISCONTINUED | OUTPATIENT
Start: 2020-06-05 | End: 2020-06-05 | Stop reason: SURG

## 2020-06-05 RX ORDER — PROPOFOL 10 MG/ML
INJECTION, EMULSION INTRAVENOUS AS NEEDED
Status: DISCONTINUED | OUTPATIENT
Start: 2020-06-05 | End: 2020-06-05 | Stop reason: SURG

## 2020-06-05 RX ORDER — PROMETHAZINE HYDROCHLORIDE 25 MG/ML
12.5 INJECTION, SOLUTION INTRAMUSCULAR; INTRAVENOUS ONCE AS NEEDED
Status: COMPLETED | OUTPATIENT
Start: 2020-06-05 | End: 2020-06-05

## 2020-06-05 RX ORDER — MEPERIDINE HYDROCHLORIDE 25 MG/ML
12.5 INJECTION INTRAMUSCULAR; INTRAVENOUS; SUBCUTANEOUS
Status: DISCONTINUED | OUTPATIENT
Start: 2020-06-05 | End: 2020-06-05 | Stop reason: HOSPADM

## 2020-06-05 RX ORDER — DEXAMETHASONE SODIUM PHOSPHATE 4 MG/ML
INJECTION, SOLUTION INTRA-ARTICULAR; INTRALESIONAL; INTRAMUSCULAR; INTRAVENOUS; SOFT TISSUE AS NEEDED
Status: DISCONTINUED | OUTPATIENT
Start: 2020-06-05 | End: 2020-06-05 | Stop reason: SURG

## 2020-06-05 RX ORDER — BUPIVACAINE HYDROCHLORIDE AND EPINEPHRINE 2.5; 5 MG/ML; UG/ML
INJECTION, SOLUTION EPIDURAL; INFILTRATION; INTRACAUDAL; PERINEURAL AS NEEDED
Status: DISCONTINUED | OUTPATIENT
Start: 2020-06-05 | End: 2020-06-05 | Stop reason: HOSPADM

## 2020-06-05 RX ORDER — SCOLOPAMINE TRANSDERMAL SYSTEM 1 MG/1
1 PATCH, EXTENDED RELEASE TRANSDERMAL ONCE
Status: DISCONTINUED | OUTPATIENT
Start: 2020-06-05 | End: 2020-06-05 | Stop reason: HOSPADM

## 2020-06-05 RX ORDER — ROCURONIUM BROMIDE 10 MG/ML
INJECTION, SOLUTION INTRAVENOUS AS NEEDED
Status: DISCONTINUED | OUTPATIENT
Start: 2020-06-05 | End: 2020-06-05 | Stop reason: SURG

## 2020-06-05 RX ADMIN — PROPOFOL 200 MG: 10 INJECTION, EMULSION INTRAVENOUS at 12:14

## 2020-06-05 RX ADMIN — PROMETHAZINE HYDROCHLORIDE 12.5 MG: 25 INJECTION INTRAMUSCULAR; INTRAVENOUS at 15:33

## 2020-06-05 RX ADMIN — FENTANYL CITRATE 25 MCG: 50 INJECTION INTRAMUSCULAR; INTRAVENOUS at 14:22

## 2020-06-05 RX ADMIN — ROCURONIUM BROMIDE 2 MG: 10 INJECTION, SOLUTION INTRAVENOUS at 13:33

## 2020-06-05 RX ADMIN — FENTANYL CITRATE 25 MCG: 50 INJECTION, SOLUTION INTRAMUSCULAR; INTRAVENOUS at 15:25

## 2020-06-05 RX ADMIN — ROCURONIUM BROMIDE 1 MG: 10 INJECTION, SOLUTION INTRAVENOUS at 12:57

## 2020-06-05 RX ADMIN — SCOPALAMINE 1 PATCH: 1 PATCH, EXTENDED RELEASE TRANSDERMAL at 10:03

## 2020-06-05 RX ADMIN — ROCURONIUM BROMIDE 5 MG: 10 INJECTION, SOLUTION INTRAVENOUS at 12:14

## 2020-06-05 RX ADMIN — DEXAMETHASONE SODIUM PHOSPHATE 8 MG: 4 INJECTION, SOLUTION INTRA-ARTICULAR; INTRALESIONAL; INTRAMUSCULAR; INTRAVENOUS; SOFT TISSUE at 12:18

## 2020-06-05 RX ADMIN — FENTANYL CITRATE 50 MCG: 50 INJECTION INTRAMUSCULAR; INTRAVENOUS at 14:40

## 2020-06-05 RX ADMIN — FENTANYL CITRATE 25 MCG: 50 INJECTION INTRAMUSCULAR; INTRAVENOUS at 13:25

## 2020-06-05 RX ADMIN — KETOROLAC TROMETHAMINE 30 MG: 30 INJECTION, SOLUTION INTRAMUSCULAR; INTRAVENOUS at 14:15

## 2020-06-05 RX ADMIN — CEFAZOLIN SODIUM 1000 MG: 1 SOLUTION INTRAVENOUS at 12:15

## 2020-06-05 RX ADMIN — SODIUM CHLORIDE, SODIUM LACTATE, POTASSIUM CHLORIDE, AND CALCIUM CHLORIDE: .6; .31; .03; .02 INJECTION, SOLUTION INTRAVENOUS at 12:10

## 2020-06-05 RX ADMIN — EPHEDRINE SULFATE 10 MG: 50 INJECTION, SOLUTION INTRAVENOUS at 12:59

## 2020-06-05 RX ADMIN — FENTANYL CITRATE 50 MCG: 50 INJECTION, SOLUTION INTRAMUSCULAR; INTRAVENOUS at 14:59

## 2020-06-05 RX ADMIN — LIDOCAINE HYDROCHLORIDE 50 MG: 10 INJECTION, SOLUTION EPIDURAL; INFILTRATION; INTRACAUDAL; PERINEURAL at 12:14

## 2020-06-05 RX ADMIN — SUGAMMADEX 200 MG: 100 INJECTION, SOLUTION INTRAVENOUS at 14:16

## 2020-06-05 RX ADMIN — FENTANYL CITRATE 100 MCG: 50 INJECTION INTRAMUSCULAR; INTRAVENOUS at 12:14

## 2020-06-05 RX ADMIN — ONDANSETRON HYDROCHLORIDE 4 MG: 2 INJECTION, SOLUTION INTRAMUSCULAR; INTRAVENOUS at 12:18

## 2020-06-05 RX ADMIN — FENTANYL CITRATE 25 MCG: 50 INJECTION, SOLUTION INTRAMUSCULAR; INTRAVENOUS at 15:16

## 2020-06-05 RX ADMIN — SODIUM CHLORIDE, SODIUM LACTATE, POTASSIUM CHLORIDE, AND CALCIUM CHLORIDE: .6; .31; .03; .02 INJECTION, SOLUTION INTRAVENOUS at 14:02

## 2020-06-16 ENCOUNTER — TELEMEDICINE (OUTPATIENT)
Dept: FAMILY MEDICINE CLINIC | Facility: CLINIC | Age: 40
End: 2020-06-16
Payer: COMMERCIAL

## 2020-06-16 DIAGNOSIS — L30.9 DERMATITIS: Primary | ICD-10-CM

## 2020-06-16 PROCEDURE — 99214 OFFICE O/P EST MOD 30 MIN: CPT | Performed by: FAMILY MEDICINE

## 2020-06-16 RX ORDER — METHYLPREDNISOLONE 4 MG/1
TABLET ORAL
Qty: 21 EACH | Refills: 0 | Status: SHIPPED | OUTPATIENT
Start: 2020-06-16 | End: 2020-11-25 | Stop reason: ALTCHOICE

## 2020-06-16 RX ORDER — VALACYCLOVIR HYDROCHLORIDE 1 G/1
1000 TABLET, FILM COATED ORAL 3 TIMES DAILY
Qty: 21 TABLET | Refills: 0 | Status: SHIPPED | OUTPATIENT
Start: 2020-06-16 | End: 2020-06-19 | Stop reason: ALTCHOICE

## 2020-06-18 ENCOUNTER — OFFICE VISIT (OUTPATIENT)
Dept: FAMILY MEDICINE CLINIC | Facility: CLINIC | Age: 40
End: 2020-06-18
Payer: COMMERCIAL

## 2020-06-18 VITALS
WEIGHT: 123 LBS | DIASTOLIC BLOOD PRESSURE: 72 MMHG | HEIGHT: 65 IN | BODY MASS INDEX: 20.49 KG/M2 | SYSTOLIC BLOOD PRESSURE: 118 MMHG | TEMPERATURE: 98.2 F | HEART RATE: 74 BPM | OXYGEN SATURATION: 99 %

## 2020-06-18 DIAGNOSIS — L30.9 DERMATITIS: Primary | ICD-10-CM

## 2020-06-18 PROBLEM — N64.81 PTOSIS OF BOTH BREASTS: Status: RESOLVED | Noted: 2020-06-05 | Resolved: 2020-06-18

## 2020-06-18 LAB
ERYTHROCYTE [DISTWIDTH] IN BLOOD BY AUTOMATED COUNT: 11.7 % (ref 11.6–15.1)
HCT VFR BLD AUTO: 41.3 % (ref 34.8–46.1)
HGB BLD-MCNC: 13.8 G/DL (ref 11.5–15.4)
INR PPP: 1.02 (ref 0.84–1.19)
MCH RBC QN AUTO: 32.7 PG (ref 26.8–34.3)
MCHC RBC AUTO-ENTMCNC: 33.4 G/DL (ref 31.4–37.4)
MCV RBC AUTO: 98 FL (ref 82–98)
PLATELET # BLD AUTO: 267 THOUSANDS/UL (ref 149–390)
PMV BLD AUTO: 10.2 FL (ref 8.9–12.7)
PROTHROMBIN TIME: 13 SECONDS (ref 11.6–14.5)
RBC # BLD AUTO: 4.22 MILLION/UL (ref 3.81–5.12)
WBC # BLD AUTO: 14.11 THOUSAND/UL (ref 4.31–10.16)

## 2020-06-18 PROCEDURE — 85610 PROTHROMBIN TIME: CPT | Performed by: NURSE PRACTITIONER

## 2020-06-18 PROCEDURE — 85027 COMPLETE CBC AUTOMATED: CPT | Performed by: NURSE PRACTITIONER

## 2020-06-18 PROCEDURE — 1036F TOBACCO NON-USER: CPT | Performed by: NURSE PRACTITIONER

## 2020-06-18 PROCEDURE — 3008F BODY MASS INDEX DOCD: CPT | Performed by: NURSE PRACTITIONER

## 2020-06-18 PROCEDURE — 36415 COLL VENOUS BLD VENIPUNCTURE: CPT | Performed by: NURSE PRACTITIONER

## 2020-06-18 PROCEDURE — 99213 OFFICE O/P EST LOW 20 MIN: CPT | Performed by: NURSE PRACTITIONER

## 2020-06-18 RX ORDER — CEPHALEXIN 500 MG/1
500 CAPSULE ORAL EVERY 6 HOURS SCHEDULED
COMMUNITY
End: 2020-06-19 | Stop reason: ALTCHOICE

## 2020-06-19 DIAGNOSIS — L30.9 DERMATITIS: Primary | ICD-10-CM

## 2020-06-19 RX ORDER — DOXYCYCLINE HYCLATE 100 MG
100 TABLET ORAL 2 TIMES DAILY
Qty: 20 TABLET | Refills: 0 | Status: SHIPPED | OUTPATIENT
Start: 2020-06-19 | End: 2020-06-29

## 2020-08-14 DIAGNOSIS — G47.00 INSOMNIA, UNSPECIFIED TYPE: ICD-10-CM

## 2020-08-14 RX ORDER — ZOLPIDEM TARTRATE 10 MG/1
10 TABLET ORAL
Qty: 30 TABLET | Refills: 1 | Status: SHIPPED | OUTPATIENT
Start: 2020-08-14 | End: 2020-10-21 | Stop reason: SDUPTHER

## 2020-09-19 ENCOUNTER — NURSE TRIAGE (OUTPATIENT)
Dept: OTHER | Facility: OTHER | Age: 40
End: 2020-09-19

## 2020-09-19 DIAGNOSIS — Z11.59 ENCOUNTER FOR SCREENING FOR OTHER VIRAL DISEASES: ICD-10-CM

## 2020-09-19 DIAGNOSIS — Z11.59 ENCOUNTER FOR SCREENING FOR OTHER VIRAL DISEASES: Primary | ICD-10-CM

## 2020-09-19 PROCEDURE — U0003 INFECTIOUS AGENT DETECTION BY NUCLEIC ACID (DNA OR RNA); SEVERE ACUTE RESPIRATORY SYNDROME CORONAVIRUS 2 (SARS-COV-2) (CORONAVIRUS DISEASE [COVID-19]), AMPLIFIED PROBE TECHNIQUE, MAKING USE OF HIGH THROUGHPUT TECHNOLOGIES AS DESCRIBED BY CMS-2020-01-R: HCPCS | Performed by: FAMILY MEDICINE

## 2020-09-19 NOTE — TELEPHONE ENCOUNTER
Regarding: Covid- Procedure this Thurs  ----- Message from [facility] sent at 9/19/2020  8:58 AM EDT -----  "I have a script for a Covid test for a procedure I am having done this Thurs   Can someone please write me an order from [facility] so I may be swabbed today "

## 2020-09-19 NOTE — TELEPHONE ENCOUNTER
Reason for Disposition   COVID-19 Testing, questions about    Answer Assessment - Initial Assessment Questions  1  CLOSE CONTACT: "Who is the person with the confirmed or suspected COVID-19 infection that you were exposed to?"      N/A    2  PLACE of CONTACT: "Where were you when you were exposed to COVID-19?" (e g , home, school, medical waiting room; which city?)      N/A    3  TYPE of CONTACT: "How much contact was there?" (e g , sitting next to, live in same house, work in same office, same building)      N/A    4  DURATION of CONTACT: "How long were you in contact with the COVID-19 patient?" (e g , a few seconds, passed by person, a few minutes, live with the patient)      N/A    5  DATE of CONTACT: "When did you have contact with a COVID-19 patient?" (e g , how many days ago)      N/A    6  TRAVEL: "Have you traveled out of the country recently?" If so, "When and where?"      * Also ask about out-of-state travel, since the [facility] has identified some high risk cities for community spread in the [address] [facility]  * Note: Travel becomes less relevant if there is widespread community transmission where the patient lives  Denies    7  COMMUNITY SPREAD: "Are there lots of cases of COVID-19 (community spread) where you live?" (See public health department website, if unsure)    * MAJOR community spread: high number of cases; numbers of cases are increasing; many people hospitalized  * MINOR community spread: low number of cases; not increasing; few or no people hospitalized      [place], [place]    8  SYMPTOMS: "Do you have any symptoms?" (e g , fever, cough, breathing difficulty)      Denies    9  PREGNANCY OR POSTPARTUM: "Is there any chance you are pregnant?" "When was your last menstrual period?" "Did you deliver in the last 2 weeks?"      Denies    10  HIGH RISK: "Do you have any heart or lung problems?  Do you have a weak immune system?" (e g , CHF, COPD, asthma, HIV positive, chemotherapy, renal failure, diabetes mellitus, sickle cell anemia)        Denies    Protocols used: CORONAVIRUS (COVID-19) - EXPOSURE-ADULT-AH

## 2020-09-19 NOTE — TELEPHONE ENCOUNTER
Pt called requesting an order be put in for an upcoming procedure on Thursday 9/24 that is out of network  Informed pt that we do not honor out of network swabs  I let her know that I am able to put an order in for the COVID-19 test but that it may result in her getting billed   Pt agreeable and requested the order be put in, regardless of whether she will get billed for the test

## 2020-09-21 LAB — SARS-COV-2 RNA SPEC QL NAA+PROBE: NOT DETECTED

## 2020-10-19 NOTE — PROGRESS NOTES
Assessment/Plan:    Labs drawn for CBC with platelets and PT/INR  Will heed results  Recommend patient add multivitamin with iron daily  Patient to observe for further bruising or bleeding  If symptoms worsen discussed referral to Hematology  Return to the office natalia Strickland Diagnoses and all orders for this visit:    Easy bruising  Comments:  Labs drawn for CBC with platelets and PT/INR  Orders:  -     CBC and Platelet  -     Protime-INR          Subjective:      Patient ID: Lazarus Boer is a 40 y o  female  Patient complains of easy bruising over the past 1 year  She denies abnormal bleeding except admits to heavy menstrual periods  She denies nose bleeds, bleeding from her gums, bleeding in her bowels or urine  Patient denies family history of bleeding disorders  The following portions of the patient's history were reviewed and updated as appropriate: allergies, current medications, past family history, past medical history, past social history, past surgical history and problem list     Review of Systems      Objective:      /60   Pulse 72   Temp 98 8 °F (37 1 °C)   Resp 16   Wt 54 4 kg (120 lb)   BMI 19 97 kg/m²          Physical Exam   Constitutional: She is oriented to person, place, and time  She appears well-developed and well-nourished  No distress  HENT:   Head: Normocephalic  Right Ear: External ear normal    Left Ear: External ear normal    Nose: Nose normal    Mouth/Throat: Oropharynx is clear and moist    Eyes: Conjunctivae are normal  No scleral icterus  Neck: Neck supple  No thyromegaly present  Cardiovascular: Normal rate and regular rhythm  Pulmonary/Chest: Effort normal and breath sounds normal    Abdominal: Soft  There is no tenderness  Musculoskeletal: She exhibits no edema  Lymphadenopathy:     She has no cervical adenopathy  Neurological: She is alert and oriented to person, place, and time  Skin: Skin is warm and dry     Few areas of mild ecchymosis in stages of healing on her legs  Psychiatric: She has a normal mood and affect  Additional Notes (Optional): DTO'S REGARDING FURTHER CARE Hide Additional Notes?: No Detail Level: Detailed Detail Level: Generalized Include Location In Plan?: Yes Additional Notes (Optional): REASSURANCE REGARDING FURTHER CARE

## 2020-10-21 DIAGNOSIS — G47.00 INSOMNIA, UNSPECIFIED TYPE: ICD-10-CM

## 2020-10-21 RX ORDER — ZOLPIDEM TARTRATE 10 MG/1
10 TABLET ORAL
Qty: 30 TABLET | Refills: 1 | Status: SHIPPED | OUTPATIENT
Start: 2020-10-21 | End: 2020-12-18 | Stop reason: SDUPTHER

## 2020-11-30 ENCOUNTER — ANESTHESIA EVENT (OUTPATIENT)
Dept: PERIOP | Facility: HOSPITAL | Age: 40
End: 2020-11-30
Payer: SELF-PAY

## 2020-11-30 PROBLEM — J45.909 ASTHMA: Status: ACTIVE | Noted: 2020-11-30

## 2020-12-01 ENCOUNTER — ANESTHESIA (OUTPATIENT)
Dept: PERIOP | Facility: HOSPITAL | Age: 40
End: 2020-12-01
Payer: SELF-PAY

## 2020-12-01 ENCOUNTER — HOSPITAL ENCOUNTER (OUTPATIENT)
Facility: HOSPITAL | Age: 40
Setting detail: OUTPATIENT SURGERY
Discharge: HOME | End: 2020-12-01
Attending: SURGERY | Admitting: SURGERY
Payer: SELF-PAY

## 2020-12-01 VITALS
SYSTOLIC BLOOD PRESSURE: 99 MMHG | WEIGHT: 120 LBS | OXYGEN SATURATION: 97 % | DIASTOLIC BLOOD PRESSURE: 60 MMHG | HEIGHT: 66 IN | BODY MASS INDEX: 19.29 KG/M2 | HEART RATE: 73 BPM | TEMPERATURE: 97.8 F | RESPIRATION RATE: 20 BRPM

## 2020-12-01 VITALS — HEART RATE: 101 BPM

## 2020-12-01 PROBLEM — J45.909 ASTHMA: Status: RESOLVED | Noted: 2020-11-30 | Resolved: 2020-12-01

## 2020-12-01 PROBLEM — N64.89 BREAST DEFORMITY: Status: ACTIVE | Noted: 2020-12-01

## 2020-12-01 LAB
EXT PREGNANCY TEST URINE: NEGATIVE
EXT. CONTROL: NORMAL

## 2020-12-01 PROCEDURE — 81025 URINE PREGNANCY TEST: CPT | Performed by: SURGERY

## 2020-12-01 RX ORDER — LIDOCAINE HYDROCHLORIDE 10 MG/ML
INJECTION, SOLUTION EPIDURAL; INFILTRATION; INTRACAUDAL; PERINEURAL AS NEEDED
Status: DISCONTINUED | OUTPATIENT
Start: 2020-12-01 | End: 2020-12-01 | Stop reason: HOSPADM

## 2020-12-01 RX ORDER — PROMETHAZINE HYDROCHLORIDE 25 MG/ML
12.5 INJECTION, SOLUTION INTRAMUSCULAR; INTRAVENOUS ONCE AS NEEDED
Status: DISCONTINUED | OUTPATIENT
Start: 2020-12-01 | End: 2020-12-01 | Stop reason: HOSPADM

## 2020-12-01 RX ORDER — CEFAZOLIN SODIUM 1 G/50ML
SOLUTION INTRAVENOUS AS NEEDED
Status: DISCONTINUED | OUTPATIENT
Start: 2020-12-01 | End: 2020-12-01

## 2020-12-01 RX ORDER — DEXAMETHASONE SODIUM PHOSPHATE 4 MG/ML
INJECTION, SOLUTION INTRA-ARTICULAR; INTRALESIONAL; INTRAMUSCULAR; INTRAVENOUS; SOFT TISSUE AS NEEDED
Status: DISCONTINUED | OUTPATIENT
Start: 2020-12-01 | End: 2020-12-01

## 2020-12-01 RX ORDER — MIDAZOLAM HYDROCHLORIDE 2 MG/2ML
INJECTION, SOLUTION INTRAMUSCULAR; INTRAVENOUS AS NEEDED
Status: DISCONTINUED | OUTPATIENT
Start: 2020-12-01 | End: 2020-12-01

## 2020-12-01 RX ORDER — HYDROCODONE BITARTRATE AND ACETAMINOPHEN 5; 325 MG/1; MG/1
1 TABLET ORAL EVERY 4 HOURS PRN
Status: DISCONTINUED | OUTPATIENT
Start: 2020-12-01 | End: 2020-12-01 | Stop reason: HOSPADM

## 2020-12-01 RX ORDER — BUPIVACAINE HYDROCHLORIDE AND EPINEPHRINE 5; 5 MG/ML; UG/ML
INJECTION, SOLUTION PERINEURAL AS NEEDED
Status: DISCONTINUED | OUTPATIENT
Start: 2020-12-01 | End: 2020-12-01 | Stop reason: HOSPADM

## 2020-12-01 RX ORDER — CEFAZOLIN SODIUM 1 G/50ML
1000 SOLUTION INTRAVENOUS ONCE
Status: DISCONTINUED | OUTPATIENT
Start: 2020-12-01 | End: 2020-12-01 | Stop reason: HOSPADM

## 2020-12-01 RX ORDER — MEPERIDINE HYDROCHLORIDE 25 MG/ML
12.5 INJECTION INTRAMUSCULAR; INTRAVENOUS; SUBCUTANEOUS ONCE
Status: COMPLETED | OUTPATIENT
Start: 2020-12-01 | End: 2020-12-01

## 2020-12-01 RX ORDER — PROPOFOL 10 MG/ML
INJECTION, EMULSION INTRAVENOUS CONTINUOUS PRN
Status: DISCONTINUED | OUTPATIENT
Start: 2020-12-01 | End: 2020-12-01

## 2020-12-01 RX ORDER — HYDROCODONE BITARTRATE AND ACETAMINOPHEN 5; 325 MG/1; MG/1
2 TABLET ORAL EVERY 4 HOURS PRN
Status: DISCONTINUED | OUTPATIENT
Start: 2020-12-01 | End: 2020-12-01 | Stop reason: HOSPADM

## 2020-12-01 RX ORDER — ROCURONIUM BROMIDE 10 MG/ML
INJECTION, SOLUTION INTRAVENOUS AS NEEDED
Status: DISCONTINUED | OUTPATIENT
Start: 2020-12-01 | End: 2020-12-01

## 2020-12-01 RX ORDER — ONDANSETRON 2 MG/ML
4 INJECTION INTRAMUSCULAR; INTRAVENOUS ONCE AS NEEDED
Status: DISCONTINUED | OUTPATIENT
Start: 2020-12-01 | End: 2020-12-01 | Stop reason: HOSPADM

## 2020-12-01 RX ORDER — PROPOFOL 10 MG/ML
INJECTION, EMULSION INTRAVENOUS AS NEEDED
Status: DISCONTINUED | OUTPATIENT
Start: 2020-12-01 | End: 2020-12-01

## 2020-12-01 RX ORDER — SODIUM CHLORIDE, SODIUM LACTATE, POTASSIUM CHLORIDE, CALCIUM CHLORIDE 600; 310; 30; 20 MG/100ML; MG/100ML; MG/100ML; MG/100ML
50 INJECTION, SOLUTION INTRAVENOUS CONTINUOUS
Status: DISCONTINUED | OUTPATIENT
Start: 2020-12-01 | End: 2020-12-01 | Stop reason: HOSPADM

## 2020-12-01 RX ORDER — ONDANSETRON 2 MG/ML
INJECTION INTRAMUSCULAR; INTRAVENOUS AS NEEDED
Status: DISCONTINUED | OUTPATIENT
Start: 2020-12-01 | End: 2020-12-01

## 2020-12-01 RX ORDER — HYDROMORPHONE HCL/PF 1 MG/ML
0.5 SYRINGE (ML) INJECTION
Status: DISCONTINUED | OUTPATIENT
Start: 2020-12-01 | End: 2020-12-01 | Stop reason: HOSPADM

## 2020-12-01 RX ORDER — SCOLOPAMINE TRANSDERMAL SYSTEM 1 MG/1
1 PATCH, EXTENDED RELEASE TRANSDERMAL
Status: DISCONTINUED | OUTPATIENT
Start: 2020-12-01 | End: 2020-12-01 | Stop reason: HOSPADM

## 2020-12-01 RX ORDER — MAGNESIUM HYDROXIDE 1200 MG/15ML
LIQUID ORAL AS NEEDED
Status: DISCONTINUED | OUTPATIENT
Start: 2020-12-01 | End: 2020-12-01 | Stop reason: HOSPADM

## 2020-12-01 RX ORDER — KETOROLAC TROMETHAMINE 30 MG/ML
30 INJECTION, SOLUTION INTRAMUSCULAR; INTRAVENOUS ONCE
Status: DISCONTINUED | OUTPATIENT
Start: 2020-12-01 | End: 2020-12-01 | Stop reason: HOSPADM

## 2020-12-01 RX ORDER — FENTANYL CITRATE 50 UG/ML
INJECTION, SOLUTION INTRAMUSCULAR; INTRAVENOUS AS NEEDED
Status: DISCONTINUED | OUTPATIENT
Start: 2020-12-01 | End: 2020-12-01

## 2020-12-01 RX ORDER — LIDOCAINE HYDROCHLORIDE 10 MG/ML
INJECTION, SOLUTION EPIDURAL; INFILTRATION; INTRACAUDAL; PERINEURAL AS NEEDED
Status: DISCONTINUED | OUTPATIENT
Start: 2020-12-01 | End: 2020-12-01

## 2020-12-01 RX ADMIN — MEPERIDINE HYDROCHLORIDE 12.5 MG: 25 INJECTION INTRAMUSCULAR; INTRAVENOUS; SUBCUTANEOUS at 12:07

## 2020-12-01 RX ADMIN — SCOPALAMINE 1 PATCH: 1 PATCH, EXTENDED RELEASE TRANSDERMAL at 08:50

## 2020-12-01 RX ADMIN — FENTANYL CITRATE 50 MCG: 50 INJECTION INTRAMUSCULAR; INTRAVENOUS at 09:52

## 2020-12-01 RX ADMIN — ROCURONIUM BROMIDE 40 MG: 10 INJECTION, SOLUTION INTRAVENOUS at 09:57

## 2020-12-01 RX ADMIN — SODIUM CHLORIDE, SODIUM LACTATE, POTASSIUM CHLORIDE, AND CALCIUM CHLORIDE: .6; .31; .03; .02 INJECTION, SOLUTION INTRAVENOUS at 09:45

## 2020-12-01 RX ADMIN — FENTANYL CITRATE 50 MCG: 50 INJECTION INTRAMUSCULAR; INTRAVENOUS at 10:51

## 2020-12-01 RX ADMIN — DEXAMETHASONE SODIUM PHOSPHATE 4 MG: 4 INJECTION, SOLUTION INTRA-ARTICULAR; INTRALESIONAL; INTRAMUSCULAR; INTRAVENOUS; SOFT TISSUE at 10:02

## 2020-12-01 RX ADMIN — SODIUM CHLORIDE, SODIUM LACTATE, POTASSIUM CHLORIDE, AND CALCIUM CHLORIDE: .6; .31; .03; .02 INJECTION, SOLUTION INTRAVENOUS at 10:33

## 2020-12-01 RX ADMIN — PROPOFOL 120 MCG/KG/MIN: 10 INJECTION, EMULSION INTRAVENOUS at 09:58

## 2020-12-01 RX ADMIN — ROCURONIUM BROMIDE 10 MG: 10 INJECTION, SOLUTION INTRAVENOUS at 10:35

## 2020-12-01 RX ADMIN — PROPOFOL 150 MG: 10 INJECTION, EMULSION INTRAVENOUS at 09:57

## 2020-12-01 RX ADMIN — ONDANSETRON HYDROCHLORIDE 4 MG: 2 INJECTION, SOLUTION INTRAMUSCULAR; INTRAVENOUS at 11:16

## 2020-12-01 RX ADMIN — LIDOCAINE HYDROCHLORIDE 40 MG: 10 INJECTION, SOLUTION EPIDURAL; INFILTRATION; INTRACAUDAL; PERINEURAL at 09:57

## 2020-12-01 RX ADMIN — CEFAZOLIN SODIUM 1000 MG: 1 SOLUTION INTRAVENOUS at 09:46

## 2020-12-01 RX ADMIN — MIDAZOLAM HYDROCHLORIDE 2 MG: 1 INJECTION, SOLUTION INTRAMUSCULAR; INTRAVENOUS at 09:52

## 2020-12-18 DIAGNOSIS — G47.00 INSOMNIA, UNSPECIFIED TYPE: ICD-10-CM

## 2020-12-18 RX ORDER — ZOLPIDEM TARTRATE 10 MG/1
10 TABLET ORAL
Qty: 30 TABLET | Refills: 1 | Status: SHIPPED | OUTPATIENT
Start: 2020-12-18 | End: 2021-02-19 | Stop reason: SDUPTHER

## 2021-02-19 DIAGNOSIS — G47.00 INSOMNIA, UNSPECIFIED TYPE: ICD-10-CM

## 2021-02-19 RX ORDER — ZOLPIDEM TARTRATE 10 MG/1
10 TABLET ORAL
Qty: 30 TABLET | Refills: 1 | Status: SHIPPED | OUTPATIENT
Start: 2021-02-19 | End: 2021-05-05 | Stop reason: SDUPTHER

## 2021-04-08 DIAGNOSIS — Z23 ENCOUNTER FOR IMMUNIZATION: ICD-10-CM

## 2021-05-05 DIAGNOSIS — G47.00 INSOMNIA, UNSPECIFIED TYPE: ICD-10-CM

## 2021-05-05 RX ORDER — ZOLPIDEM TARTRATE 10 MG/1
10 TABLET ORAL
Qty: 30 TABLET | Refills: 1 | Status: SHIPPED | OUTPATIENT
Start: 2021-05-05 | End: 2021-07-22 | Stop reason: SDUPTHER

## 2021-07-22 DIAGNOSIS — G47.00 INSOMNIA, UNSPECIFIED TYPE: ICD-10-CM

## 2021-07-22 RX ORDER — ZOLPIDEM TARTRATE 10 MG/1
10 TABLET ORAL
Qty: 30 TABLET | Refills: 0 | Status: SHIPPED | OUTPATIENT
Start: 2021-07-22 | End: 2021-08-24 | Stop reason: SDUPTHER

## 2021-08-24 DIAGNOSIS — G47.00 INSOMNIA, UNSPECIFIED TYPE: ICD-10-CM

## 2021-08-24 RX ORDER — ZOLPIDEM TARTRATE 10 MG/1
10 TABLET ORAL
Qty: 30 TABLET | Refills: 0 | Status: SHIPPED | OUTPATIENT
Start: 2021-08-24 | End: 2021-10-01 | Stop reason: SDUPTHER

## 2021-10-01 DIAGNOSIS — G47.00 INSOMNIA, UNSPECIFIED TYPE: ICD-10-CM

## 2021-10-01 RX ORDER — ZOLPIDEM TARTRATE 10 MG/1
10 TABLET ORAL
Qty: 30 TABLET | Refills: 0 | Status: SHIPPED | OUTPATIENT
Start: 2021-10-01 | End: 2021-11-10 | Stop reason: SDUPTHER

## 2021-10-05 ENCOUNTER — OFFICE VISIT (OUTPATIENT)
Dept: FAMILY MEDICINE CLINIC | Facility: CLINIC | Age: 41
End: 2021-10-05
Payer: COMMERCIAL

## 2021-10-05 VITALS
WEIGHT: 123 LBS | SYSTOLIC BLOOD PRESSURE: 110 MMHG | TEMPERATURE: 98.4 F | HEIGHT: 66 IN | HEART RATE: 64 BPM | BODY MASS INDEX: 19.77 KG/M2 | DIASTOLIC BLOOD PRESSURE: 64 MMHG | OXYGEN SATURATION: 99 % | RESPIRATION RATE: 16 BRPM

## 2021-10-05 DIAGNOSIS — Z23 NEED FOR INFLUENZA VACCINATION: ICD-10-CM

## 2021-10-05 DIAGNOSIS — G47.00 INSOMNIA, UNSPECIFIED TYPE: Primary | ICD-10-CM

## 2021-10-05 PROCEDURE — 90686 IIV4 VACC NO PRSV 0.5 ML IM: CPT

## 2021-10-05 PROCEDURE — 99213 OFFICE O/P EST LOW 20 MIN: CPT | Performed by: FAMILY MEDICINE

## 2021-10-05 PROCEDURE — 3008F BODY MASS INDEX DOCD: CPT | Performed by: FAMILY MEDICINE

## 2021-10-05 PROCEDURE — 90471 IMMUNIZATION ADMIN: CPT

## 2021-10-05 PROCEDURE — 3725F SCREEN DEPRESSION PERFORMED: CPT | Performed by: FAMILY MEDICINE

## 2021-10-05 PROCEDURE — 1036F TOBACCO NON-USER: CPT | Performed by: FAMILY MEDICINE

## 2021-10-05 RX ORDER — ESZOPICLONE 1 MG/1
1 TABLET, FILM COATED ORAL
Qty: 30 TABLET | Refills: 0 | Status: SHIPPED | OUTPATIENT
Start: 2021-10-05 | End: 2022-03-31

## 2021-11-10 DIAGNOSIS — G47.00 INSOMNIA, UNSPECIFIED TYPE: ICD-10-CM

## 2021-11-10 RX ORDER — ZOLPIDEM TARTRATE 10 MG/1
10 TABLET ORAL
Qty: 30 TABLET | Refills: 0 | Status: SHIPPED | OUTPATIENT
Start: 2021-11-10 | End: 2021-12-16 | Stop reason: SDUPTHER

## 2021-12-16 DIAGNOSIS — G47.00 INSOMNIA, UNSPECIFIED TYPE: ICD-10-CM

## 2021-12-16 RX ORDER — ZOLPIDEM TARTRATE 10 MG/1
10 TABLET ORAL
Qty: 30 TABLET | Refills: 0 | Status: SHIPPED | OUTPATIENT
Start: 2021-12-16 | End: 2022-01-17 | Stop reason: SDUPTHER

## 2022-01-17 DIAGNOSIS — G47.00 INSOMNIA, UNSPECIFIED TYPE: ICD-10-CM

## 2022-01-17 RX ORDER — ZOLPIDEM TARTRATE 10 MG/1
10 TABLET ORAL
Qty: 30 TABLET | Refills: 0 | Status: SHIPPED | OUTPATIENT
Start: 2022-01-17 | End: 2022-02-17 | Stop reason: SDUPTHER

## 2022-02-17 DIAGNOSIS — G47.00 INSOMNIA, UNSPECIFIED TYPE: ICD-10-CM

## 2022-02-17 RX ORDER — ZOLPIDEM TARTRATE 10 MG/1
10 TABLET ORAL
Qty: 30 TABLET | Refills: 0 | Status: SHIPPED | OUTPATIENT
Start: 2022-02-17 | End: 2022-03-17 | Stop reason: SDUPTHER

## 2022-03-17 DIAGNOSIS — G47.00 INSOMNIA, UNSPECIFIED TYPE: ICD-10-CM

## 2022-03-17 RX ORDER — ZOLPIDEM TARTRATE 10 MG/1
10 TABLET ORAL
Qty: 30 TABLET | Refills: 0 | Status: SHIPPED | OUTPATIENT
Start: 2022-03-17 | End: 2022-04-19 | Stop reason: SDUPTHER

## 2022-03-17 NOTE — TELEPHONE ENCOUNTER
Failed Protocol      Psychiatry:  Anxiolytics/Hypnotics Failed    This refill cannot be delegated    Valid encounter within last 6 months

## 2022-03-31 ENCOUNTER — OFFICE VISIT (OUTPATIENT)
Dept: FAMILY MEDICINE CLINIC | Facility: CLINIC | Age: 42
End: 2022-03-31
Payer: COMMERCIAL

## 2022-03-31 VITALS
BODY MASS INDEX: 20.83 KG/M2 | TEMPERATURE: 97.7 F | HEIGHT: 65 IN | SYSTOLIC BLOOD PRESSURE: 110 MMHG | WEIGHT: 125 LBS | DIASTOLIC BLOOD PRESSURE: 66 MMHG | HEART RATE: 72 BPM | OXYGEN SATURATION: 99 % | RESPIRATION RATE: 16 BRPM

## 2022-03-31 DIAGNOSIS — R35.0 URINARY FREQUENCY: Primary | ICD-10-CM

## 2022-03-31 DIAGNOSIS — N32.81 OVERACTIVE BLADDER: ICD-10-CM

## 2022-03-31 DIAGNOSIS — R39.9 UTI SYMPTOMS: ICD-10-CM

## 2022-03-31 DIAGNOSIS — N39.3 URINARY, INCONTINENCE, STRESS FEMALE: ICD-10-CM

## 2022-03-31 LAB
SL AMB  POCT GLUCOSE, UA: NORMAL
SL AMB LEUKOCYTE ESTERASE,UA: NEGATIVE
SL AMB POCT BILIRUBIN,UA: NEGATIVE
SL AMB POCT BLOOD,UA: NEGATIVE
SL AMB POCT CLARITY,UA: ABNORMAL
SL AMB POCT COLOR,UA: YELLOW
SL AMB POCT KETONES,UA: NEGATIVE
SL AMB POCT NITRITE,UA: NEGATIVE
SL AMB POCT PH,UA: 5
SL AMB POCT SPECIFIC GRAVITY,UA: 1.01
SL AMB POCT URINE PROTEIN: ABNORMAL
SL AMB POCT UROBILINOGEN: NORMAL

## 2022-03-31 PROCEDURE — 81002 URINALYSIS NONAUTO W/O SCOPE: CPT | Performed by: FAMILY MEDICINE

## 2022-03-31 PROCEDURE — 99213 OFFICE O/P EST LOW 20 MIN: CPT | Performed by: FAMILY MEDICINE

## 2022-03-31 PROCEDURE — 87086 URINE CULTURE/COLONY COUNT: CPT | Performed by: FAMILY MEDICINE

## 2022-03-31 NOTE — PROGRESS NOTES
Assessment/Plan:  UA dip is negative  Urine sent for culture  Will heed results  Symptoms consistent with overactive bladder and history of urinary stress incontinence  Discussed treatment options  Recommend patient avoid bladder irritants including caffeine  Recommend increase water intake throughout the day  Recommend patient follow-up with Uro gynecologist specialist in [place]  Return the office natalia Mccall Diagnoses and all orders for this visit:    Urinary frequency  -     Urine culture; Future    UTI symptoms  -     POCT urine dip  -     Urine culture; Future    Urinary, incontinence, stress female    Overactive bladder          Subjective:      Patient ID: Melinda Gamino is a 37 y o  female  Past 1-2 weeks patient complains of increased frequency of urination at night and incomplete emptying  She admits to occasional burning with urination but denies hematuria  Patient denies significant symptoms throughout the day  Patient admits to history of urinary stress incontinence and was evaluated by urogynecologist in [place] who recommended sling procedure  Patient admits to drinking 1 cup of coffee daily  She admits to not drinking much fluids throughout the day  Urinary Tract Infection   This is a new problem  The current episode started 1 to 4 weeks ago  The problem has been waxing and waning  The quality of the pain is described as burning  There has been no fever  Associated symptoms include frequency  Pertinent negatives include no chills, flank pain, hematuria, hesitancy, nausea, sweats, urgency or vomiting  She has tried increased fluids for the symptoms  The treatment provided no relief   There is no history of kidney stones or recurrent UTIs  stress incontinence       The following portions of the patient's history were reviewed and updated as appropriate: allergies, current medications, past family history, past medical history, past social history, past surgical history and problem list     Review of Systems   Constitutional: Negative for chills  Gastrointestinal: Negative for nausea and vomiting  Genitourinary: Positive for frequency  Negative for flank pain, hematuria, hesitancy and urgency  Objective:      /66 (BP Location: Left arm, Patient Position: Sitting, Cuff Size: Standard)   Pulse 72   Temp 97 7 °F (36 5 °C) (Skin)   Resp 16   Ht 5' 5" (1 651 m)   Wt 56 7 kg (125 lb)   SpO2 99%   BMI 20 80 kg/m²          Physical Exam  Constitutional:       General: She is not in acute distress  Appearance: Normal appearance  She is not ill-appearing  HENT:      Head: Normocephalic  Mouth/Throat:      Mouth: Mucous membranes are moist       Pharynx: Oropharynx is clear  Eyes:      General: No scleral icterus  Conjunctiva/sclera: Conjunctivae normal    Cardiovascular:      Rate and Rhythm: Normal rate and regular rhythm  Pulmonary:      Effort: Pulmonary effort is normal       Breath sounds: Normal breath sounds  Abdominal:      Palpations: Abdomen is soft  Tenderness: There is no abdominal tenderness  There is no right CVA tenderness or left CVA tenderness  Musculoskeletal:      Cervical back: Neck supple  Right lower leg: No edema  Left lower leg: No edema  Lymphadenopathy:      Cervical: No cervical adenopathy  Skin:     General: Skin is warm and dry  Neurological:      General: No focal deficit present  Mental Status: She is alert and oriented to person, place, and time  Psychiatric:         Mood and Affect: Mood normal          Behavior: Behavior normal          Thought Content:  Thought content normal          Judgment: Judgment normal

## 2022-04-02 LAB — BACTERIA UR CULT: NORMAL

## 2022-04-19 DIAGNOSIS — G47.00 INSOMNIA, UNSPECIFIED TYPE: ICD-10-CM

## 2022-04-19 RX ORDER — ZOLPIDEM TARTRATE 10 MG/1
10 TABLET ORAL
Qty: 30 TABLET | Refills: 0 | Status: SHIPPED | OUTPATIENT
Start: 2022-04-19 | End: 2022-05-17 | Stop reason: SDUPTHER

## 2022-05-17 DIAGNOSIS — G47.00 INSOMNIA, UNSPECIFIED TYPE: ICD-10-CM

## 2022-05-17 RX ORDER — ZOLPIDEM TARTRATE 10 MG/1
10 TABLET ORAL
Qty: 30 TABLET | Refills: 0 | Status: SHIPPED | OUTPATIENT
Start: 2022-05-17 | End: 2022-06-20 | Stop reason: SDUPTHER

## 2022-05-25 NOTE — PRE-PROCEDURE INSTRUCTIONS
Pre-Surgery Instructions:   Medication Instructions    MELATONIN PO Stop taking 7 days prior to surgery   zolpidem (AMBIEN) 10 mg tablet Hold day of surgery  Have you had / have a sore throat? No  Have you had / have a cough less than 1 week? No  Have you had / have a fever greater than 100 0 - 100  4? No  Are you experiencing any shortness of breath? No    Review with patient via phone medications and showering instructions  Instructed to avoid all ASA and OTC Vit/Supp 1 week prior to surgery and to avoid NSAIDs 3 days prior to surgery per anesthesia instructions  Tylenol ok to take prn  [facility] call with surgery schedule time, nothing eat or drink after midnight  Verbalized understanding

## 2022-06-02 ENCOUNTER — ANESTHESIA EVENT (OUTPATIENT)
Dept: PERIOP | Facility: HOSPITAL | Age: 42
End: 2022-06-02
Payer: COMMERCIAL

## 2022-06-02 NOTE — ANESTHESIA PREPROCEDURE EVALUATION
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        Physical Exam    Airway    Mallampati score: II  TM Distance: >3 FB  Neck ROM: full     Dental   No notable dental hx     Cardiovascular  Cardiovascular exam normal    Pulmonary  Pulmonary exam normal     Other Findings        Anesthesia Plan  ASA Score- 2     Anesthesia Type- general with ASA Monitors  Additional Monitors:   Airway Plan: ETT  Comment: No recent labs  Plan Factors-Exercise tolerance (METS): >4 METS  Chart reviewed  Existing labs reviewed  Patient is not a current smoker  Patient not instructed to abstain from smoking on day of procedure  Patient did not smoke on day of surgery  Induction- intravenous  Postoperative Plan-     Informed Consent- Anesthetic plan and risks discussed with patient  I personally reviewed this patient with the CRNA  Discussed and agreed on the Anesthesia Plan with the CRNA  Paulino Cooley

## 2022-06-03 ENCOUNTER — ANESTHESIA (OUTPATIENT)
Dept: PERIOP | Facility: HOSPITAL | Age: 42
End: 2022-06-03
Payer: COMMERCIAL

## 2022-06-03 ENCOUNTER — HOSPITAL ENCOUNTER (OUTPATIENT)
Facility: HOSPITAL | Age: 42
Setting detail: OUTPATIENT SURGERY
Discharge: HOME | End: 2022-06-03
Attending: SURGERY | Admitting: SURGERY
Payer: COMMERCIAL

## 2022-06-03 VITALS
RESPIRATION RATE: 19 BRPM | BODY MASS INDEX: 20.49 KG/M2 | TEMPERATURE: 97.7 F | HEIGHT: 65 IN | SYSTOLIC BLOOD PRESSURE: 118 MMHG | OXYGEN SATURATION: 92 % | WEIGHT: 123 LBS | DIASTOLIC BLOOD PRESSURE: 72 MMHG | HEART RATE: 73 BPM

## 2022-06-03 DIAGNOSIS — N64.82 HYPOPLASIA OF BREAST: ICD-10-CM

## 2022-06-03 DIAGNOSIS — R22.2 LOCALIZED SWELLING, MASS AND LUMP, TRUNK: ICD-10-CM

## 2022-06-03 PROBLEM — R11.2 PONV (POSTOPERATIVE NAUSEA AND VOMITING): Status: ACTIVE | Noted: 2022-06-03

## 2022-06-03 PROBLEM — Z98.890 PONV (POSTOPERATIVE NAUSEA AND VOMITING): Status: ACTIVE | Noted: 2022-06-03

## 2022-06-03 LAB
EXT PREGNANCY TEST URINE: NEGATIVE
EXT. CONTROL: NORMAL

## 2022-06-03 PROCEDURE — 81025 URINE PREGNANCY TEST: CPT | Performed by: SURGERY

## 2022-06-03 PROCEDURE — 88307 TISSUE EXAM BY PATHOLOGIST: CPT | Performed by: PATHOLOGY

## 2022-06-03 PROCEDURE — L8600 IMPLANT BREAST SILICONE/EQ: HCPCS | Performed by: SURGERY

## 2022-06-03 PROCEDURE — C1781 MESH (IMPLANTABLE): HCPCS | Performed by: SURGERY

## 2022-06-03 DEVICE — NATRELLE INSPIRA SCF 335CC FULL PROFILE  SMOOTH ROUND SILICONE
Type: IMPLANTABLE DEVICE | Site: BREAST | Status: FUNCTIONAL
Brand: NATRELLE INSPIRA COHESIVE BREAST IMPLANTS

## 2022-06-03 DEVICE — GALAFLEX 3D SCAFFOLD, 5.3 CM X 15.5 CM, SMALL OVAL (CONTENTS: 2)
Type: IMPLANTABLE DEVICE | Site: BREAST | Status: FUNCTIONAL
Brand: GALAFLEX 3D

## 2022-06-03 RX ORDER — NEOSTIGMINE METHYLSULFATE 1 MG/ML
INJECTION INTRAVENOUS AS NEEDED
Status: DISCONTINUED | OUTPATIENT
Start: 2022-06-03 | End: 2022-06-03

## 2022-06-03 RX ORDER — MAGNESIUM HYDROXIDE 1200 MG/15ML
LIQUID ORAL AS NEEDED
Status: DISCONTINUED | OUTPATIENT
Start: 2022-06-03 | End: 2022-06-03 | Stop reason: HOSPADM

## 2022-06-03 RX ORDER — GLYCOPYRROLATE 0.2 MG/ML
INJECTION INTRAMUSCULAR; INTRAVENOUS AS NEEDED
Status: DISCONTINUED | OUTPATIENT
Start: 2022-06-03 | End: 2022-06-03

## 2022-06-03 RX ORDER — PROPOFOL 10 MG/ML
INJECTION, EMULSION INTRAVENOUS AS NEEDED
Status: DISCONTINUED | OUTPATIENT
Start: 2022-06-03 | End: 2022-06-03

## 2022-06-03 RX ORDER — DEXAMETHASONE SODIUM PHOSPHATE 10 MG/ML
INJECTION, SOLUTION INTRAMUSCULAR; INTRAVENOUS AS NEEDED
Status: DISCONTINUED | OUTPATIENT
Start: 2022-06-03 | End: 2022-06-03

## 2022-06-03 RX ORDER — FENTANYL CITRATE 50 UG/ML
INJECTION, SOLUTION INTRAMUSCULAR; INTRAVENOUS AS NEEDED
Status: DISCONTINUED | OUTPATIENT
Start: 2022-06-03 | End: 2022-06-03

## 2022-06-03 RX ORDER — ONDANSETRON 2 MG/ML
4 INJECTION INTRAMUSCULAR; INTRAVENOUS ONCE AS NEEDED
Status: COMPLETED | OUTPATIENT
Start: 2022-06-03 | End: 2022-06-03

## 2022-06-03 RX ORDER — CEFAZOLIN SODIUM 1 G/50ML
SOLUTION INTRAVENOUS AS NEEDED
Status: DISCONTINUED | OUTPATIENT
Start: 2022-06-03 | End: 2022-06-03

## 2022-06-03 RX ORDER — MIDAZOLAM HYDROCHLORIDE 2 MG/2ML
INJECTION, SOLUTION INTRAMUSCULAR; INTRAVENOUS AS NEEDED
Status: DISCONTINUED | OUTPATIENT
Start: 2022-06-03 | End: 2022-06-03

## 2022-06-03 RX ORDER — PROPOFOL 10 MG/ML
INJECTION, EMULSION INTRAVENOUS CONTINUOUS PRN
Status: DISCONTINUED | OUTPATIENT
Start: 2022-06-03 | End: 2022-06-03

## 2022-06-03 RX ORDER — LIDOCAINE HYDROCHLORIDE AND EPINEPHRINE 10; 10 MG/ML; UG/ML
INJECTION, SOLUTION INFILTRATION; PERINEURAL AS NEEDED
Status: DISCONTINUED | OUTPATIENT
Start: 2022-06-03 | End: 2022-06-03 | Stop reason: HOSPADM

## 2022-06-03 RX ORDER — ROCURONIUM BROMIDE 10 MG/ML
INJECTION, SOLUTION INTRAVENOUS AS NEEDED
Status: DISCONTINUED | OUTPATIENT
Start: 2022-06-03 | End: 2022-06-03

## 2022-06-03 RX ORDER — SODIUM CHLORIDE, SODIUM LACTATE, POTASSIUM CHLORIDE, CALCIUM CHLORIDE 600; 310; 30; 20 MG/100ML; MG/100ML; MG/100ML; MG/100ML
125 INJECTION, SOLUTION INTRAVENOUS CONTINUOUS
Status: DISCONTINUED | OUTPATIENT
Start: 2022-06-03 | End: 2022-06-03 | Stop reason: HOSPADM

## 2022-06-03 RX ORDER — KETOROLAC TROMETHAMINE 30 MG/ML
30 INJECTION, SOLUTION INTRAMUSCULAR; INTRAVENOUS ONCE
Status: COMPLETED | OUTPATIENT
Start: 2022-06-03 | End: 2022-06-03

## 2022-06-03 RX ORDER — LIDOCAINE HYDROCHLORIDE 10 MG/ML
INJECTION, SOLUTION EPIDURAL; INFILTRATION; INTRACAUDAL; PERINEURAL AS NEEDED
Status: DISCONTINUED | OUTPATIENT
Start: 2022-06-03 | End: 2022-06-03

## 2022-06-03 RX ORDER — SCOLOPAMINE TRANSDERMAL SYSTEM 1 MG/1
1 PATCH, EXTENDED RELEASE TRANSDERMAL ONCE
Status: DISCONTINUED | OUTPATIENT
Start: 2022-06-03 | End: 2022-06-03 | Stop reason: HOSPADM

## 2022-06-03 RX ORDER — ONDANSETRON 2 MG/ML
INJECTION INTRAMUSCULAR; INTRAVENOUS AS NEEDED
Status: DISCONTINUED | OUTPATIENT
Start: 2022-06-03 | End: 2022-06-03

## 2022-06-03 RX ORDER — FENTANYL CITRATE/PF 50 MCG/ML
25 SYRINGE (ML) INJECTION
Status: COMPLETED | OUTPATIENT
Start: 2022-06-03 | End: 2022-06-03

## 2022-06-03 RX ORDER — CEFAZOLIN SODIUM 1 G/50ML
1000 SOLUTION INTRAVENOUS ONCE
Status: DISCONTINUED | OUTPATIENT
Start: 2022-06-03 | End: 2022-06-03 | Stop reason: HOSPADM

## 2022-06-03 RX ORDER — HYDROCODONE BITARTRATE AND ACETAMINOPHEN 5; 325 MG/1; MG/1
1 TABLET ORAL EVERY 4 HOURS PRN
Status: DISCONTINUED | OUTPATIENT
Start: 2022-06-03 | End: 2022-06-03 | Stop reason: HOSPADM

## 2022-06-03 RX ORDER — HYDROCODONE BITARTRATE AND ACETAMINOPHEN 5; 325 MG/1; MG/1
2 TABLET ORAL EVERY 4 HOURS PRN
Status: DISCONTINUED | OUTPATIENT
Start: 2022-06-03 | End: 2022-06-03 | Stop reason: HOSPADM

## 2022-06-03 RX ADMIN — ROCURONIUM BROMIDE 10 MG: 10 INJECTION INTRAVENOUS at 15:00

## 2022-06-03 RX ADMIN — MIDAZOLAM 2 MG: 1 INJECTION INTRAMUSCULAR; INTRAVENOUS at 12:46

## 2022-06-03 RX ADMIN — ROCURONIUM BROMIDE 40 MG: 10 INJECTION INTRAVENOUS at 12:51

## 2022-06-03 RX ADMIN — FENTANYL CITRATE 25 MCG: 50 INJECTION, SOLUTION INTRAMUSCULAR; INTRAVENOUS at 16:24

## 2022-06-03 RX ADMIN — FENTANYL CITRATE 50 MCG: 50 INJECTION, SOLUTION INTRAMUSCULAR; INTRAVENOUS at 15:37

## 2022-06-03 RX ADMIN — GLYCOPYRROLATE 0.4 MG: 0.4 INJECTION INTRAMUSCULAR; INTRAVENOUS at 15:30

## 2022-06-03 RX ADMIN — ONDANSETRON 4 MG: 2 INJECTION INTRAMUSCULAR; INTRAVENOUS at 15:21

## 2022-06-03 RX ADMIN — FENTANYL CITRATE 25 MCG: 50 INJECTION, SOLUTION INTRAMUSCULAR; INTRAVENOUS at 16:05

## 2022-06-03 RX ADMIN — CEFAZOLIN SODIUM 1000 MG: 1 SOLUTION INTRAVENOUS at 12:55

## 2022-06-03 RX ADMIN — FENTANYL CITRATE 25 MCG: 50 INJECTION, SOLUTION INTRAMUSCULAR; INTRAVENOUS at 16:12

## 2022-06-03 RX ADMIN — ROCURONIUM BROMIDE 20 MG: 10 INJECTION INTRAVENOUS at 13:57

## 2022-06-03 RX ADMIN — PROPOFOL 170 MG: 10 INJECTION, EMULSION INTRAVENOUS at 12:51

## 2022-06-03 RX ADMIN — ONDANSETRON 4 MG: 2 INJECTION INTRAMUSCULAR; INTRAVENOUS at 16:10

## 2022-06-03 RX ADMIN — PROPOFOL 120 MCG/KG/MIN: 10 INJECTION, EMULSION INTRAVENOUS at 12:56

## 2022-06-03 RX ADMIN — SCOPALAMINE 1 PATCH: 1 PATCH, EXTENDED RELEASE TRANSDERMAL at 12:07

## 2022-06-03 RX ADMIN — ROCURONIUM BROMIDE 10 MG: 10 INJECTION INTRAVENOUS at 13:27

## 2022-06-03 RX ADMIN — ROCURONIUM BROMIDE 20 MG: 10 INJECTION INTRAVENOUS at 14:34

## 2022-06-03 RX ADMIN — FENTANYL CITRATE 25 MCG: 50 INJECTION, SOLUTION INTRAMUSCULAR; INTRAVENOUS at 16:31

## 2022-06-03 RX ADMIN — LIDOCAINE HYDROCHLORIDE 40 MG: 10 INJECTION, SOLUTION EPIDURAL; INFILTRATION; INTRACAUDAL at 12:51

## 2022-06-03 RX ADMIN — KETOROLAC TROMETHAMINE 30 MG: 30 INJECTION, SOLUTION INTRAMUSCULAR; INTRAVENOUS at 16:15

## 2022-06-03 RX ADMIN — FENTANYL CITRATE 50 MCG: 50 INJECTION, SOLUTION INTRAMUSCULAR; INTRAVENOUS at 12:51

## 2022-06-03 RX ADMIN — SODIUM CHLORIDE, SODIUM LACTATE, POTASSIUM CHLORIDE, AND CALCIUM CHLORIDE 125 ML/HR: .6; .31; .03; .02 INJECTION, SOLUTION INTRAVENOUS at 16:03

## 2022-06-03 RX ADMIN — SODIUM CHLORIDE, SODIUM LACTATE, POTASSIUM CHLORIDE, AND CALCIUM CHLORIDE: .6; .31; .03; .02 INJECTION, SOLUTION INTRAVENOUS at 12:40

## 2022-06-03 RX ADMIN — NEOSTIGMINE 2.5 MG: 1 INJECTION INTRAVENOUS at 15:30

## 2022-06-03 RX ADMIN — DEXAMETHASONE SODIUM PHOSPHATE 10 MG: 10 INJECTION, SOLUTION INTRAMUSCULAR; INTRAVENOUS at 13:03

## 2022-06-03 RX ADMIN — SODIUM CHLORIDE, SODIUM LACTATE, POTASSIUM CHLORIDE, AND CALCIUM CHLORIDE: .6; .31; .03; .02 INJECTION, SOLUTION INTRAVENOUS at 13:58

## 2022-06-03 NOTE — DISCHARGE SUMMARY
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]  Localized swelling, mass and lump, trunk [R22 2]  Principal Procedure:   BREAST IMPLANT EXCHANGE, PLACEMENT OF GALAFLEX (Bilateral Breast)  EXCISION  MASS LOWER BACK (Left Back)  Discharge Medications:  See after visit summary for reconciled discharge medications provided to patient and family  Reason for Admission:  Frida Cotto was electively admitted to undergo the above named procedure on 6/3/2022 as an outpatient  Hospital Course:  Patient underwent the above named procedure on the day of admission without complications  They were discharged home the same day  Disposition:  To home in care of self and family    Condition:  Good  Follow up:  Patient with follow up in the office with Dr Tay Schultz MD in 1 week(s) or as scheduled per his office  Tay Schultz MD  6/3/2022 12:40 PM

## 2022-06-03 NOTE — INTERVAL H&P NOTE
H&P reviewed  After examining the patient I find no changes in the patients condition since the H&P had been written      Vitals:    06/03/22 1113   BP: 122/68   Pulse: 75   Resp: 16   Temp: (!) 97 3 °F (36 3 °C)

## 2022-06-03 NOTE — DISCHARGE INSTRUCTIONS
[facility]  [address] [facility], [address] [facility], [address], [place], [address] [facility]   [phone] / [phone] / www [email or web address]        Please call the office today to schedule a post operative appointment if not already done      You will be given a prescription for pain medicine  You can use extra strength Tylenol, Advil, or Aleve as substitute  Follow directions on the bottle  Do not take any aspirin or medication containing aspirin for two weeks following surgery  No excessive sun exposure for 6 weeks  following your surgery, the use of protective sunscreen lotion thereafter at all times will be necessary  Do not smoke  Do not drive until instructed to do so, usually one week postoperatively  You may shower 24 hours after surgery unless otherwise instructed  If you have any questions, aspen call the office at [phone]

## 2022-06-03 NOTE — OP NOTE
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]     * Localized swelling, mass and lump, trunk [R22 2]    Procedure(s) (LRB):  BREAST IMPLANT EXCHANGE, PLACEMENT OF GALAFLEX (Bilateral)  EXCISION  MASS LOWER BACK (Left)    Specimen(s):  ID Type Source Tests Collected by Time Destination   1 : Left Lower Back Mass Tissue Soft Tissue, Other TISSUE EXAM Stevan Gauthier MD 6/3/2022 1328        Estimated Blood Loss:   Minimal    Drains:  * No LDAs found *    Anesthesia Type:   General    Operative Indications:  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]  Localized swelling, mass and lump, trunk [R22 2]  Loss of inframammary fold    Operative Findings:  none    Complications:   None    Procedure and Technique:  Patient identified correctly on the table  Intubated by anesthesia  Initially placed right lateral decubitus adequate padding and support  The mass on the backside was marked in the sitting position identified  An incision was made overlying the mass in a horizontal fashion  The areas injected with 0 25% Marcaine with epinephrine  A 15 blade was used to incise through the back incision  We then carried this down electrocautery into identified the mass  Once the mass was identified we then slowly dissected this off of the surrounding tissues with tenotomy scissors  Once the mass was identified it was taken out in 1 piece was sent off to pathology as specimen  Total size of the mass and its longest diameter was 3 2 cm  Hemostasis obtained  We then went ahead and closed this area in multiple layers  The fascial layers closed with 2-0 Vicryl suture    Deep dermis was closed with 3-0 PDS   Running subcuticular 3-0 Monocryl suture  And Dermabond dressing  We then flipped the patient back into the supine position  We then prepped and draped the breast area  The old inframammary incision was excised off with a 15 blade and electrocautery  We then dissected down electrocautery through the skin soft tissue down onto the capsule  We then dissected inferiorly underneath the implant just below the capsule  We retook the capsule up off of the underlying chest wall  The implants on both sides were then removed  The implants were found to be intact  At this point we irrigated out the pocket with saline Betadine solution  We then elevated the pectoralis major and the pocket both medially and superior with electrocautery  At this point a 794 SCF silicone cohesive implant by [employer] was placed into the pocket  The new inframammary fold was marked with a marking pen  The implant was removed and soaked back in Betadine solution  Then using GalaFLEX we placed this along the new inframammary fold  Anchoring this down with 2-0 Prolene sutures along the chest wall  After this was done 1  Prolene was used to anchor this along the anterior aspect of the capsule  The superior portion of the GalaFLEX was anchored to the position just below the nipple-areolar complex and horizontally across the breast pocket  Prior to tying the sutures down the pocket was irrigated with Betadine solution the implant was placed back in the pocket and then these sutures were cinched down elevating the GalaFLEX up to the nipple-areolar complex and horizontally across the pocket  This done both left and right breast   At this point we then took the capsule from the superior breast skin and anchored this down onto the chest wall with 2-0 Prolene sutures    After this was done we then went ahead and anchored the deep dermis down onto the chest wall closing the upper and lower incision with 2-0 PDS sutures the deep dermis was approximated using 3-0 PDS and skin was closed using the 3-0 Monocryl stitch  The same procedure was performed on both the right breast   Prior to closure we did tighten the lateral pockets of both breasts with a 3 layer running 1  Stratafix suture  We had good symmetry better placement of the inframammary fold  Patient was placed in a surgical bra  Same procedure was performed on both sides will be dictated once  Patient was then placed in surgical bra taken recovery in stable condition  All counts correct x2         I was present for the entire procedure    Patient Disposition:  PACU       SIGNATURE: Jamie Schaefer MD  DATE: Arielle 3, 2022  TIME: 3:45 PM

## 2022-06-03 NOTE — ANESTHESIA POSTPROCEDURE EVALUATION
Post-Op Assessment Note    CV Status:  Stable    Pain management: adequate     Mental Status:  Somnolent   Hydration Status:  Euvolemic   PONV Controlled:  Controlled   Airway Patency:  Patent      Post Op Vitals Reviewed: Yes      Staff: CRNA         No complications documented      /59 (06/03/22 1549)    Temp 97 7 °F (36 5 °C) (06/03/22 1549)    Pulse 72 (06/03/22 1549)   Resp 12 (06/03/22 1549)    SpO2 100 % (06/03/22 1549)

## 2022-06-20 DIAGNOSIS — G47.00 INSOMNIA, UNSPECIFIED TYPE: ICD-10-CM

## 2022-06-20 RX ORDER — ZOLPIDEM TARTRATE 10 MG/1
10 TABLET ORAL
Qty: 30 TABLET | Refills: 0 | Status: SHIPPED | OUTPATIENT
Start: 2022-06-20 | End: 2022-07-18 | Stop reason: SDUPTHER

## 2022-07-18 DIAGNOSIS — G47.00 INSOMNIA, UNSPECIFIED TYPE: ICD-10-CM

## 2022-07-18 RX ORDER — ZOLPIDEM TARTRATE 10 MG/1
10 TABLET ORAL
Qty: 30 TABLET | Refills: 0 | Status: SHIPPED | OUTPATIENT
Start: 2022-07-18 | End: 2022-08-22 | Stop reason: SDUPTHER

## 2022-08-22 DIAGNOSIS — G47.00 INSOMNIA, UNSPECIFIED TYPE: ICD-10-CM

## 2022-08-22 RX ORDER — ZOLPIDEM TARTRATE 10 MG/1
10 TABLET ORAL
Qty: 30 TABLET | Refills: 2 | Status: SHIPPED | OUTPATIENT
Start: 2022-08-22 | End: 2022-09-23 | Stop reason: SDUPTHER

## 2022-09-23 DIAGNOSIS — G47.00 INSOMNIA, UNSPECIFIED TYPE: ICD-10-CM

## 2022-09-26 RX ORDER — ZOLPIDEM TARTRATE 10 MG/1
10 TABLET ORAL
Qty: 30 TABLET | Refills: 2 | Status: SHIPPED | OUTPATIENT
Start: 2022-09-26

## 2022-09-26 NOTE — TELEPHONE ENCOUNTER
Protocol Fail  zolpidem (AMBIEN) 10 mg tablet          Sig: Take 1 tablet (10 mg total) by mouth daily at bedtime as needed for sleep    Disp:  30 tablet    Refills:  2    Start: 9/23/2022    Class: Normal    Non-formulary For: Insomnia, unspecified type    To pharmacy: Not to exceed 4 additional fills before 07/29/2020    Last ordered: 1 month ago by Rizwana Moreno DO     Psychiatry:  Anxiolytics/Hypnotics Failed 09/23/2022 04:34 PM   Protocol Details  This refill cannot be delegated    Valid encounter within last 6 months

## 2022-12-28 DIAGNOSIS — G47.00 INSOMNIA, UNSPECIFIED TYPE: ICD-10-CM

## 2022-12-28 RX ORDER — ZOLPIDEM TARTRATE 10 MG/1
10 TABLET ORAL
Qty: 30 TABLET | Refills: 2 | Status: SHIPPED | OUTPATIENT
Start: 2022-12-28

## 2022-12-28 NOTE — TELEPHONE ENCOUNTER
Pt LM on refill line requesting refill on: zolpidem (AMBIEN) 10 mg tablet     To be sent to [facility] in [place]

## 2022-12-28 NOTE — TELEPHONE ENCOUNTER
Protocol fail  zolpidem (AMBIEN) 10 mg tablet          Sig: Take 1 tablet (10 mg total) by mouth daily at bedtime as needed for sleep    Disp:  30 tablet    Refills:  2    Start: 12/28/2022    Class: Normal    Non-formulary For: Insomnia, unspecified type    To pharmacy: Not to exceed 4 additional fills before 07/29/2020    Last ordered: 3 months ago by Abraham Dueñas DO     Psychiatry:  Anxiolytics/Hypnotics Failed 12/28/2022 11:59 AM   Protocol Details  This refill cannot be delegated    Valid encounter within last 6 months      To be filled at: [facility]/pharmacy #[id number] [facility] [address]

## 2023-03-31 DIAGNOSIS — G47.00 INSOMNIA, UNSPECIFIED TYPE: ICD-10-CM

## 2023-03-31 NOTE — TELEPHONE ENCOUNTER
Pt LM on refill line requesting refill on:  zolpidem (AMBIEN) 10 mg tablet     To be sent to [facility] in [place]

## 2023-03-31 NOTE — TELEPHONE ENCOUNTER
Failed protocol (pt scheduled yearly physical for 04/13/23)    Psychiatry:  Anxiolytics/Hypnotics Failed 03/31/2023 04:05 PM   Protocol Details  This refill cannot be delegated    Valid encounter within last 6 months

## 2023-04-01 RX ORDER — ZOLPIDEM TARTRATE 10 MG/1
10 TABLET ORAL
Qty: 30 TABLET | Refills: 0 | Status: SHIPPED | OUTPATIENT
Start: 2023-04-01

## 2023-04-13 PROBLEM — N39.3 SUI (STRESS URINARY INCONTINENCE, FEMALE): Status: ACTIVE | Noted: 2022-11-23

## 2023-05-02 DIAGNOSIS — G47.00 INSOMNIA, UNSPECIFIED TYPE: ICD-10-CM

## 2023-05-02 RX ORDER — ZOLPIDEM TARTRATE 10 MG/1
10 TABLET ORAL
Qty: 30 TABLET | Refills: 5 | Status: SHIPPED | OUTPATIENT
Start: 2023-05-02

## 2023-05-02 NOTE — TELEPHONE ENCOUNTER
Pt LM on refill line requesting refill on:  zolpidem (AMBIEN) 10 mg tablet     To be sent to [facility] in [place]

## 2023-05-02 NOTE — TELEPHONE ENCOUNTER
Requested medication(s) are due for refill today: Yes  Patient has already received a courtesy refill: No  Other reason request has been forwarded to provider: failed protocol    Psychiatry:  Anxiolytics/Hypnotics Failed 05/02/2023 01:48 PM   Protocol Details  This refill cannot be delegated    Valid encounter within last 6 months

## 2023-05-30 ENCOUNTER — OFFICE VISIT (OUTPATIENT)
Dept: FAMILY MEDICINE CLINIC | Facility: CLINIC | Age: 43
End: 2023-05-30

## 2023-05-30 VITALS
OXYGEN SATURATION: 97 % | DIASTOLIC BLOOD PRESSURE: 74 MMHG | HEIGHT: 65 IN | BODY MASS INDEX: 20.76 KG/M2 | TEMPERATURE: 97.9 F | HEART RATE: 72 BPM | RESPIRATION RATE: 16 BRPM | SYSTOLIC BLOOD PRESSURE: 118 MMHG | WEIGHT: 124.6 LBS

## 2023-05-30 DIAGNOSIS — J01.00 ACUTE MAXILLARY SINUSITIS, RECURRENCE NOT SPECIFIED: Primary | ICD-10-CM

## 2023-05-30 RX ORDER — AMOXICILLIN AND CLAVULANATE POTASSIUM 875; 125 MG/1; MG/1
1 TABLET, FILM COATED ORAL EVERY 12 HOURS SCHEDULED
Qty: 20 TABLET | Refills: 0 | Status: SHIPPED | OUTPATIENT
Start: 2023-05-30 | End: 2023-06-09

## 2023-05-30 NOTE — PROGRESS NOTES
Name: Elina Owens      : 1980      MRN: [id number]  Encounter Provider: Karyle Reading, DO  Encounter Date: 2023   Encounter department: [address] [place] [address]   Patient will be started on Augmentin 875 mg 1 twice daily with food for 10 days  Patient to continue Flonase 2 sprays per nostril daily and may take Sudafed and Mucinex as needed  Patient may take Tylenol as needed  Recommend increase fluids and rest   Return the office in 1 week or call sooner as needed  1  Acute maxillary sinusitis, recurrence not specified  -     amoxicillin-clavulanate (Augmentin) 875-125 mg per tablet; Take 1 tablet by mouth every 12 (twelve) hours for 10 days           Subjective      Patient started 3 weeks ago with headache and nasal congestion/stuffiness  She admits to sinus pressure, earache and sore throat  Patient admits to low-grade fever to 99 9  Patient was seen in urgent care 10 days ago and treated with Toradol with temporary relief of her headache  She has been taking Tylenol Extra Strength, Sudafed, Zyrtec and Flonase without significant relief  Earache   Associated symptoms include headaches and a sore throat  Pertinent negatives include no coughing  Sore Throat   Associated symptoms include congestion, ear pain and headaches  Pertinent negatives include no coughing, hoarse voice or shortness of breath  Sinus Problem  This is a new problem  The current episode started 1 to 4 weeks ago  The problem is unchanged  There has been no fever  Associated symptoms include congestion, ear pain, headaches, sinus pressure and a sore throat  Pertinent negatives include no chills, coughing, diaphoresis, hoarse voice, shortness of breath or sneezing  Past treatments include acetaminophen and oral decongestants (flonase)  The treatment provided mild relief  Review of Systems   Constitutional: Negative for chills and diaphoresis     HENT: Positive for congestion, ear "pain, sinus pressure and sore throat  Negative for hoarse voice and sneezing  Respiratory: Negative for cough and shortness of breath  Neurological: Positive for headaches  Current Outpatient Medications on File Prior to Visit   Medication Sig   • MELATONIN PO Take by mouth daily at bedtime   • zolpidem (AMBIEN) 10 mg tablet Take 1 tablet (10 mg total) by mouth daily at bedtime as needed for sleep       Objective     /74 (BP Location: Left arm, Patient Position: Sitting, Cuff Size: Standard)   Pulse 72   Temp 97 9 °F (36 6 °C) (Tympanic)   Resp 16   Ht 5' 5\" (1 651 m)   Wt 56 5 kg (124 lb 9 6 oz)   SpO2 97%   BMI 20 73 kg/m²     Physical Exam  Constitutional:       General: She is not in acute distress  Appearance: Normal appearance  She is not ill-appearing, toxic-appearing or diaphoretic  HENT:      Head: Normocephalic  Comments: Left maxillary tenderness  Right Ear: Tympanic membrane normal       Ears:      Comments: Left TM dull     Nose: Congestion present  Mouth/Throat:      Mouth: Mucous membranes are moist       Pharynx: Oropharynx is clear  Eyes:      General: No scleral icterus  Conjunctiva/sclera: Conjunctivae normal    Cardiovascular:      Rate and Rhythm: Normal rate and regular rhythm  Pulmonary:      Effort: Pulmonary effort is normal       Breath sounds: Normal breath sounds  Abdominal:      Palpations: Abdomen is soft  Tenderness: There is no abdominal tenderness  Musculoskeletal:      Cervical back: Neck supple  Right lower leg: No edema  Left lower leg: No edema  Lymphadenopathy:      Cervical: No cervical adenopathy  Skin:     General: Skin is warm and dry  Neurological:      General: No focal deficit present  Mental Status: She is alert and oriented to person, place, and time  Psychiatric:         Mood and Affect: Mood normal          Behavior: Behavior normal          Thought Content:  Thought content normal     " Judgment: Judgment normal        Sola Munoz, DO

## 2023-07-05 ENCOUNTER — OFFICE VISIT (OUTPATIENT)
Dept: FAMILY MEDICINE CLINIC | Facility: CLINIC | Age: 43
End: 2023-07-05
Payer: COMMERCIAL

## 2023-07-05 VITALS
TEMPERATURE: 97 F | BODY MASS INDEX: 20.49 KG/M2 | SYSTOLIC BLOOD PRESSURE: 104 MMHG | DIASTOLIC BLOOD PRESSURE: 64 MMHG | HEIGHT: 65 IN | RESPIRATION RATE: 16 BRPM | HEART RATE: 80 BPM | OXYGEN SATURATION: 99 % | WEIGHT: 123 LBS

## 2023-07-05 DIAGNOSIS — F32.A ANXIETY AND DEPRESSION: Primary | ICD-10-CM

## 2023-07-05 DIAGNOSIS — F41.9 ANXIETY AND DEPRESSION: Primary | ICD-10-CM

## 2023-07-05 PROBLEM — K64.1 SECOND DEGREE HEMORRHOIDS: Status: RESOLVED | Noted: 2018-11-02 | Resolved: 2023-07-05

## 2023-07-05 PROBLEM — Z12.31 ENCOUNTER FOR SCREENING MAMMOGRAM FOR MALIGNANT NEOPLASM OF BREAST: Status: ACTIVE | Noted: 2023-07-05

## 2023-07-05 PROCEDURE — 99214 OFFICE O/P EST MOD 30 MIN: CPT | Performed by: FAMILY MEDICINE

## 2023-07-05 RX ORDER — ESCITALOPRAM OXALATE 10 MG/1
10 TABLET ORAL DAILY
Qty: 30 TABLET | Refills: 5 | Status: SHIPPED | OUTPATIENT
Start: 2023-07-05 | End: 2024-01-01

## 2023-07-05 NOTE — PROGRESS NOTES
Name: Dajuan Perez      : 1980      MRN: [id number]  Encounter Provider: Chantale Joseph DO  Encounter Date: 2023   Encounter department: [address] [place] [address] [place]   Discussed treatment options with patient. Patient restarted on Lexapro 10 mg half a tablet daily with food for 1 to 2 weeks then increase to 1 tablet daily with food. Discussed potential side effects and black box warning. Patient is being referred to Latasha Schneider, behavioral therapist counseling. Return to the office in 2 to 3 months or call sooner as needed. 1. Anxiety and depression  -     Ambulatory Referral to [facility]; Future  -     escitalopram (LEXAPRO) 10 mg tablet; Take 1 tablet (10 mg total) by mouth daily           Subjective      Patient complains of problems with anxiety and depression over the past 3 months. Symptoms started after her oldest daughter moved back home and having family issues. 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 She admits to lack of motivation in addition and has not been exercising recently. Appetite remains okay and patient sleeping fairly well overall. Reviewed recent labs. Anxiety  Presents for follow-up visit. Symptoms include decreased concentration, depressed mood, excessive worry, irritability, nervous/anxious behavior, palpitations, panic, restlessness and shortness of breath. Patient reports no chest pain, confusion, hyperventilation, insomnia or suicidal ideas. Symptoms occur most days. The severity of symptoms is interfering with daily activities and causing significant distress. The quality of sleep is fair. Nighttime awakenings: occasional.         Review of Systems   Constitutional: Positive for irritability. Respiratory: Positive for shortness of breath. Cardiovascular: Positive for palpitations. Negative for chest pain.    Psychiatric/Behavioral: Positive for decreased concentration. Negative for confusion and suicidal ideas. The patient is nervous/anxious. The patient does not have insomnia. Current Outpatient Medications on File Prior to Visit   Medication Sig   • MELATONIN PO Take by mouth daily at bedtime   • zolpidem (AMBIEN) 10 mg tablet Take 1 tablet (10 mg total) by mouth daily at bedtime as needed for sleep       Objective     /64 (BP Location: Left arm, Patient Position: Sitting, Cuff Size: Standard)   Pulse 80   Temp (!) 97 °F (36.1 °C) (Tympanic)   Resp 16   Ht 5' 5" (1.651 m)   Wt 55.8 kg (123 lb)   SpO2 99%   BMI 20.47 kg/m²     Physical Exam  Constitutional:       General: She is not in acute distress. Appearance: Normal appearance. HENT:      Head: Normocephalic. Mouth/Throat:      Mouth: Mucous membranes are moist.   Eyes:      General: No scleral icterus. Conjunctiva/sclera: Conjunctivae normal.   Cardiovascular:      Rate and Rhythm: Normal rate and regular rhythm. Pulmonary:      Effort: Pulmonary effort is normal.      Breath sounds: Normal breath sounds. Abdominal:      Palpations: Abdomen is soft. Tenderness: There is no abdominal tenderness. Musculoskeletal:      Cervical back: Neck supple. Right lower leg: No edema. Left lower leg: No edema. Lymphadenopathy:      Cervical: No cervical adenopathy. Skin:     General: Skin is warm and dry. Neurological:      General: No focal deficit present. Mental Status: She is alert and oriented to person, place, and time. Psychiatric:         Mood and Affect: Mood normal.         Behavior: Behavior normal.         Thought Content:  Thought content normal.         Judgment: Judgment normal.       Jordin Turner DO

## 2023-07-27 DIAGNOSIS — F32.A ANXIETY AND DEPRESSION: ICD-10-CM

## 2023-07-27 DIAGNOSIS — F41.9 ANXIETY AND DEPRESSION: ICD-10-CM

## 2023-07-27 RX ORDER — ESCITALOPRAM OXALATE 10 MG/1
10 TABLET ORAL DAILY
Qty: 90 TABLET | Refills: 2 | Status: SHIPPED | OUTPATIENT
Start: 2023-07-27

## 2023-07-28 ENCOUNTER — SOCIAL WORK (OUTPATIENT)
Dept: BEHAVIORAL/MENTAL HEALTH CLINIC | Facility: CLINIC | Age: 43
End: 2023-07-28
Payer: COMMERCIAL

## 2023-07-28 DIAGNOSIS — F41.9 ANXIETY: Primary | ICD-10-CM

## 2023-07-28 PROCEDURE — 90791 PSYCH DIAGNOSTIC EVALUATION: CPT | Performed by: SOCIAL WORKER

## 2023-07-28 NOTE — PSYCH
Assessment/Plan:      Diagnoses and all orders for this visit:    Anxiety          Subjective:  Pt presented for initial therapy session. PHQ-9 = 4;  LILIAM-7 = 14. Pt grew up in [place] with both parents and 4 siblings. She is very close with her family. 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 Pt took a 6 month leave and returned 3 months ago (mental health reasons). Pt is  to her 2nd , Mary Campo (together 8 years and  5 years). Together they have one child (Jordyn Palmer). Sandra Arriaza has 3 other children from her 1st marriage (Gregory Sixto 21, & Sonal Mar 16). Pt's first   from a drug overdose in 2019. Pt enjoys spending time with family, cooking, gardening, movies and reading. All of pt's children currently reside in the home but Lisandra Majano will be moving out this weekend. Lisandra Majano has been a major source of pt's anxiety over the last year or so. Lisandra Majano moved out of the house and in with her boyfriend and his family for 6 months before returning home. She is now moving out with 2 other girls she met on the internet. Pt has not met either of these two girls. Pt's sleep is good so long as she takes her sleep medicine. Otherwise, she wakes in the middle of the night with anxiety. Her appetite is good. Pt recently took a 3-day course on Patience held by her [place]. Pt is seeking to learn coping skills for her anxiety and stress. Processed pt's feelings at length and offered suggestions for coping. Patient ID: Ethan Agustin is a 40 y.o. female. HPI    Review of Systems   Psychiatric/Behavioral: The patient is nervous/anxious. Objective:     Physical Exam  Psychiatric:         Behavior: Behavior is cooperative. Behavioral Health Psychotherapy Assessment    Date of Initial Psychotherapy Assessment: 23  Referral Source: Dr. Iftikhar Ramsay  Has a release of information been signed for the referral source?  No    Preferred Name: Ethan Agustin  Preferred Pronouns: She/her  YOB: 1980 Age: 40 y.o. Sex assigned at birth: female   Gender Identity: Female  Race:   Preferred Language: English    Emergency Contact:  Full Name: Richelle Campbell  Relationship to Client: Mother  Contact information: [phone]    Primary Care Physician:  Luis Miguel Saba   [phone]  Has a release of information been signed? No    Physical Health History:  Past surgical procedures: See chart  Do you have a history of any of the following: other na  Do you have any mobility issues? No    Relevant Family History: Younger brother with undiagnosed MH    Presenting Problem (What brings you in?)  Anxiety    Mental Health Advance Directive:  Do you currently have a [address] Directive? no    Diagnosis:   Diagnosis ICD-10-CM Associated Orders   1.  Anxiety  F41.9           Initial Assessment:     Current Mental Status:    Appearance: appropriate      Behavior/Manner: cooperative      Affect/Mood:  Good and anxious    Speech:  Normal    Sleep:  Interrupted    Oriented to: oriented to self, oriented to place and oriented to time       Clinical Symptoms    Anxiety: yes      Anxiety Symptoms: nervous/anxious and difficulty controlling worry      Have you ever been self-injurious: No      Counseling History:  Previous Counseling or Treatment  (Mental Health or Drug & Alcohol): No    Have you previously taken psychiatric medications: No      Suicide Risk Assessment  Have you ever had a suicide attempt: No    Are you currently experiencing suicidal thoughts: No      Substance Abuse/Addiction Assessment:  Alcohol: Yes    Frequency:  Other  Other frequency:  Social  Heroin: No    Fentanyl: No    Opiates: No    Cocaine: No    Amphetamines: No    Hallucinogens: No    Club Drugs: No    Benzodiazepines: No    Other Rx Meds: No    Marijuana: No    Tobacco/Nicotine: No    Have you experienced blackouts as a result of substance use: No    Are you currently using any Medication Assisted Treatment for Substance Use: No      Disordered Eating History:  Do you have a history of disordered eating: No      Social Determinants of Health:    SDOH:  None    Trauma and Abuse History:    Have you ever been abused: No      Relationship History:    Current marital status:       Natural Supports:   Mother, father and siblings    Employment History    Are you currently employed: Yes      Employer/ Job title:  [address] [place] [address] [place] of income/financial support:  Work     History:      Status: no history of [address] duty    Recommended Treatment:     Psychotherapy:  Individual sessions    Frequency:  1 time    Session frequency:  Monthly      Visit start and stop times:    07/28/23  Start Time: 1102  Stop Time: 1147  Total Visit Time: 45 minutes

## 2023-09-08 ENCOUNTER — SOCIAL WORK (OUTPATIENT)
Dept: BEHAVIORAL/MENTAL HEALTH CLINIC | Facility: CLINIC | Age: 43
End: 2023-09-08
Payer: COMMERCIAL

## 2023-09-08 DIAGNOSIS — F41.9 ANXIETY: Primary | ICD-10-CM

## 2023-09-08 PROCEDURE — 90832 PSYTX W PT 30 MINUTES: CPT | Performed by: SOCIAL WORKER

## 2023-09-08 NOTE — PSYCH
Assessment/Plan:      Diagnoses and all orders for this visit:    Anxiety          Subjective: Pt presented for follow up therapy session. Pt reported that her 7yo daughter, Graham Javier is having separation anxiety issues now that she is back in school. Pt reported that the teacher has called her 3 times now to report that Keyona is crying loudly and disrupting the classroom. Pt explained that she is giving Keyona things to look forward to at the end of the day when she comes home in hopes that Graham Javier has a better day at school. Pt is feeling at a loss with what to do for her. Suggested pt may want to seek a child-therapist for Keyona. Pt has been taking the Lexapro for about 6 weeks now. Pt's sleep is better and she is able to concentrate better at work. Karin Williamson moved out and is working a new job at [employer].  Communication between pt and her  has improved. Pt is doing some things for her own self care as well. Processed pt's feelings and encouraged pt to continue to utilize positive self care. Patient ID: Ada Carr is a 40 y.o. female. HPI    Review of Systems      Objective:     Physical Exam      Behavioral Health Psychotherapy Progress Note    Psychotherapy Provided: Individual Psychotherapy     1. Anxiety            Goals addressed in session: Goal 1     DATA:   During this session, this clinician used the following therapeutic modalities: Engagement Strategies, Client-centered Therapy, Mindfulness-based Strategies and Supportive Psychotherapy    Substance Abuse was not addressed during this session. If the client is diagnosed with a co-occurring substance use disorder, please indicate any changes in the frequency or amount of use: na. Stage of change for addressing substance use diagnoses: No substance use/Not applicable    ASSESSMENT:  Ada Carr presents with a Anxious mood. her affect is Tearful, which is congruent, with her mood and the content of the session.  The client has made progress on their goals. Jasmina Amaya presents with a none risk of suicide, none risk of self-harm, and none risk of harm to others. For any risk assessment that surpasses a "low" rating, a safety plan must be developed. A safety plan was indicated: no  If yes, describe in detail na    PLAN: Between sessions, Jasmina Amaya will continue to utilize positive self care. At the next session, the therapist will use Engagement Strategies, Client-centered Therapy, Mindfulness-based Strategies and Supportive Psychotherapy to address anxiety. Behavioral Health Treatment Plan and Discharge Planning: Jasmina Amaya is aware of and agrees to continue to work on their treatment plan. They have identified and are working toward their discharge goals.  yes    Visit start and stop times:    09/08/23  Start Time: 0900  Stop Time: 0934  Total Visit Time: 34 minutes

## 2023-09-12 ENCOUNTER — RA CDI HCC (OUTPATIENT)
Dept: OTHER | Facility: HOSPITAL | Age: 43
End: 2023-09-12

## 2023-09-12 NOTE — PROGRESS NOTES
[facility] [address] [facility] coding opportunities       Chart reviewed, no opportunity found: CHART REVIEWED, NO OPPORTUNITY FOUND        Patients Insurance        Commercial Insurance: [employer]

## 2023-10-20 DIAGNOSIS — G47.00 INSOMNIA, UNSPECIFIED TYPE: ICD-10-CM

## 2023-10-20 RX ORDER — ZOLPIDEM TARTRATE 10 MG/1
10 TABLET ORAL
Qty: 30 TABLET | Refills: 2 | Status: SHIPPED | OUTPATIENT
Start: 2023-10-20

## 2023-10-20 NOTE — TELEPHONE ENCOUNTER
zolpidem (AMBIEN) 10 mg tablet         Sig: Take 1 tablet (10 mg total) by mouth daily at bedtime as needed for sleep    Disp: 30 tablet    Refills: 5    Start: 10/20/2023    Class: Normal    Non-formulary For: Insomnia, unspecified type    Last ordered: 5 months ago (5/2/2023) by Violeta Logan DO    Psychiatry:  [facility]olytics/Hypnot[facility] Wbmlns38/20/2023 11:18 AM   Protocol Details This refill cannot be delegated    Valid encounter within last 6 months      To be filled at: [facility]/pharmacy #[id number]- [address] [address] [address]

## 2024-01-22 DIAGNOSIS — G47.00 INSOMNIA, UNSPECIFIED TYPE: ICD-10-CM

## 2024-01-22 RX ORDER — ZOLPIDEM TARTRATE 10 MG/1
10 TABLET ORAL
Qty: 30 TABLET | Refills: 2 | Status: SHIPPED | OUTPATIENT
Start: 2024-01-22

## 2024-01-22 NOTE — TELEPHONE ENCOUNTER
zolpidem (AMBIEN) 10 mg tablet         Sig: Take 1 tablet (10 mg total) by mouth daily at bedtime as needed for sleep    Disp: 30 tablet    Refills: 2    Start: 1/22/2024    Class: Normal    Non-formulary For: Insomnia, unspecified type    Last ordered: 3 months ago (10/20/2023) by Samantha Pickard DO    Psychiatry:  Anxiolytics/Hypnotics Kycxbu4801/22/2024 09:58 AM   Protocol Details This refill cannot be delegated    Valid encounter within last 6 months      To be filled at: [facility]/pharmacy #[id number] - [place] - [address]

## 2024-01-22 NOTE — TELEPHONE ENCOUNTER
Reason for call:   [x] Refill   [] Prior Auth  [] Other:     Office:   [x] PCP/Provider - Luis Neil  [] Specialty/Provider -     Medication: Zolpidem     Dose/Frequency: 10 mg Q HS PRN    Quantity: 30    [facility] [place] [address]    Does the patient have enough for 3 days?   [] Yes   [x] No - Send as HP to POD

## 2024-04-16 ENCOUNTER — OFFICE VISIT (OUTPATIENT)
Dept: FAMILY MEDICINE CLINIC | Facility: CLINIC | Age: 44
End: 2024-04-16
Payer: COMMERCIAL

## 2024-04-16 VITALS
SYSTOLIC BLOOD PRESSURE: 100 MMHG | HEIGHT: 65 IN | RESPIRATION RATE: 16 BRPM | DIASTOLIC BLOOD PRESSURE: 70 MMHG | OXYGEN SATURATION: 99 % | TEMPERATURE: 98.1 F | HEART RATE: 84 BPM | WEIGHT: 127 LBS | BODY MASS INDEX: 21.16 KG/M2

## 2024-04-16 DIAGNOSIS — R11.2 NAUSEA AND VOMITING, UNSPECIFIED VOMITING TYPE: Primary | ICD-10-CM

## 2024-04-16 DIAGNOSIS — R21 RASH: ICD-10-CM

## 2024-04-16 DIAGNOSIS — J02.9 SORE THROAT: ICD-10-CM

## 2024-04-16 DIAGNOSIS — R10.13 DYSPEPSIA: ICD-10-CM

## 2024-04-16 LAB — S PYO AG THROAT QL: NEGATIVE

## 2024-04-16 PROCEDURE — 99213 OFFICE O/P EST LOW 20 MIN: CPT | Performed by: FAMILY MEDICINE

## 2024-04-16 PROCEDURE — 87880 STREP A ASSAY W/OPTIC: CPT | Performed by: FAMILY MEDICINE

## 2024-04-16 NOTE — PROGRESS NOTES
Assessment/Plan:  Rapid strep test is negative.  Discussed treatment options.  Recommend patient take Pepcid 20 mg twice daily and Benadryl as needed.  She may take Tylenol as needed.  Recommend increase fluids and rest.  Return to the office in 1 week or call sooner as needed.  No problem-specific Assessment & Plan notes found for this encounter.       Diagnoses and all orders for this visit:    Nausea and vomiting, unspecified vomiting type    Rash    Sore throat  -     POCT rapid ANTIGEN strepA    Dyspepsia          Subjective:      Patient ID: Baljit Lew is a 43 y.o. female.    Last week patient developed nausea, vomiting and heartburn for 4 to 5 days.  Also headache and joint pains.  She denies any diarrhea.  Patient had low-grade fever of 99.8.  Patient was seen at urgent care 3 days ago treated with Toradol injection and Zofran with relief of nausea and vomiting.  Heartburn persists and patient took an acid last night with relief.  Yesterday patient developed diffuse rash all over her body except face.  She denies itching.  Today patient admits to mild sore throat.  No treatment by patient.  Patient denies ill contacts.    Rash  This is a new problem. The current episode started yesterday. The problem has been gradually worsening since onset. The affected locations include the left arm, right arm, torso, chest, abdomen, left upper leg, left lower leg, right upper leg and right lower leg. The rash is characterized by redness. She was exposed to a new medication. Associated symptoms include fatigue, joint pain, a sore throat and vomiting. Pertinent negatives include no anorexia, congestion, cough, diarrhea, fever or shortness of breath. Past treatments include nothing.       The following portions of the patient's history were reviewed and updated as appropriate: allergies, current medications, past family history, past medical history, past social history, past surgical history, and problem  "list.    Review of Systems   Constitutional:  Positive for fatigue. Negative for fever.   HENT:  Positive for sore throat. Negative for congestion.    Respiratory:  Negative for cough and shortness of breath.    Gastrointestinal:  Positive for vomiting. Negative for anorexia and diarrhea.   Musculoskeletal:  Positive for joint pain.   Skin:  Positive for rash.         Objective:      /70   Pulse 85   Temp 98.1 °F (36.7 °C)   Ht 5' 5\" (1.651 m)   Wt 57.6 kg (127 lb)   SpO2 99%   BMI 21.13 kg/m²          Physical Exam  Constitutional:       General: She is not in acute distress.     Appearance: Normal appearance. She is not ill-appearing, toxic-appearing or diaphoretic.   HENT:      Head: Normocephalic.      Right Ear: Tympanic membrane normal.      Left Ear: Tympanic membrane normal.      Nose: Nose normal.      Mouth/Throat:      Mouth: Mucous membranes are moist.      Pharynx: Posterior oropharyngeal erythema present.   Eyes:      General: No scleral icterus.     Conjunctiva/sclera: Conjunctivae normal.   Cardiovascular:      Rate and Rhythm: Normal rate and regular rhythm.   Pulmonary:      Effort: Pulmonary effort is normal.      Breath sounds: Normal breath sounds.   Abdominal:      Palpations: Abdomen is soft.      Tenderness: There is no abdominal tenderness.   Musculoskeletal:      Cervical back: Neck supple.      Right lower leg: No edema.      Left lower leg: No edema.   Lymphadenopathy:      Cervical: No cervical adenopathy.   Skin:     General: Skin is warm and dry.      Findings: Rash present.      Comments: Diffuse erythematous rash.   Neurological:      General: No focal deficit present.      Mental Status: She is alert and oriented to person, place, and time.   Psychiatric:         Mood and Affect: Mood normal.         Behavior: Behavior normal.         Thought Content: Thought content normal.         Judgment: Judgment normal.           "

## 2024-04-18 ENCOUNTER — TELEPHONE (OUTPATIENT)
Age: 44
End: 2024-04-18

## 2024-04-18 NOTE — TELEPHONE ENCOUNTER
19Return in about 1 week (around 4/23/2024), or if symptoms worsen or fail to improve, for Recheck.      Patient calling back to do Recheck. Scheduled for 04/19/2024

## 2024-04-19 ENCOUNTER — OFFICE VISIT (OUTPATIENT)
Dept: FAMILY MEDICINE CLINIC | Facility: CLINIC | Age: 44
End: 2024-04-19
Payer: COMMERCIAL

## 2024-04-19 VITALS
TEMPERATURE: 98 F | BODY MASS INDEX: 20.73 KG/M2 | HEIGHT: 66 IN | SYSTOLIC BLOOD PRESSURE: 107 MMHG | OXYGEN SATURATION: 100 % | WEIGHT: 129 LBS | DIASTOLIC BLOOD PRESSURE: 58 MMHG | HEART RATE: 94 BPM

## 2024-04-19 DIAGNOSIS — M25.50 POLYARTHRALGIA: Primary | ICD-10-CM

## 2024-04-19 DIAGNOSIS — Z11.59 NEED FOR HEPATITIS C SCREENING TEST: ICD-10-CM

## 2024-04-19 DIAGNOSIS — Z11.4 SCREENING FOR HIV (HUMAN IMMUNODEFICIENCY VIRUS): ICD-10-CM

## 2024-04-19 DIAGNOSIS — Z12.31 ENCOUNTER FOR SCREENING MAMMOGRAM FOR BREAST CANCER: ICD-10-CM

## 2024-04-19 PROCEDURE — 99214 OFFICE O/P EST MOD 30 MIN: CPT | Performed by: FAMILY MEDICINE

## 2024-04-19 NOTE — ASSESSMENT & PLAN NOTE
Patient with persistent symptoms of polyarthralgia over the past 2 to 2-1/2 weeks following likely viral illness.    Differential diagnosis is broad includes viral etiology, autoimmune conditions, Lyme, STI, thyroid dysfunction.    Recommend laboratory workup as ordered below for further evaluation.    Continue with ibuprofen as needed for symptomatic management.    Follow up in 1-2 weeks or sooner as needed should symptoms persist or worsen

## 2024-04-20 DIAGNOSIS — D72.819 LEUKOPENIA, UNSPECIFIED TYPE: ICD-10-CM

## 2024-04-20 DIAGNOSIS — M25.50 POLYARTHRALGIA: Primary | ICD-10-CM

## 2024-04-20 DIAGNOSIS — R76.8 POSITIVE ANTI-CCP TEST: ICD-10-CM

## 2024-04-20 LAB
ACANTHOCYTES BLD QL SMEAR: ABNORMAL
ALBUMIN SERPL-MCNC: 4 G/DL (ref 3.5–5.7)
ALP SERPL-CCNC: 28 U/L (ref 35–120)
ALT SERPL-CCNC: 10 U/L
ANION GAP SERPL CALCULATED.3IONS-SCNC: 7 MMOL/L (ref 3–11)
AST SERPL-CCNC: 12 U/L
BACTERIA URNS QL MICRO: ABNORMAL
BASOPHILS # BLD AUTO: 0 THOU/CMM (ref 0–0.1)
BASOPHILS NFR BLD AUTO: 1 %
BILIRUB SERPL-MCNC: 0.4 MG/DL (ref 0.2–1)
BUN SERPL-MCNC: 14 MG/DL (ref 7–25)
BURR CELLS BLD QL SMEAR: ABNORMAL
CALCIUM SERPL-MCNC: 8.7 MG/DL (ref 8.5–10.1)
CHLORIDE SERPL-SCNC: 107 MMOL/L (ref 100–109)
CO2 SERPL-SCNC: 28 MMOL/L (ref 21–31)
CREAT SERPL-MCNC: 0.81 MG/DL (ref 0.4–1.1)
CRP SERPL-MCNC: 2.4 MG/L
CYTOLOGY CMNT CVX/VAG CYTO-IMP: ABNORMAL
DIFFERENTIAL METHOD BLD: ABNORMAL
EOSINOPHIL # BLD AUTO: 0.1 THOU/CMM (ref 0–0.5)
EOSINOPHIL NFR BLD AUTO: 2 %
ERYTHROCYTE [DISTWIDTH] IN BLOOD BY AUTOMATED COUNT: 13 % (ref 12–16)
GFR/BSA.PRED SERPLBLD CYS-BASED-ARV: 93 ML/MIN/{1.73_M2}
GLUCOSE SERPL-MCNC: 88 MG/DL (ref 65–99)
GLUCOSE UR QL STRIP: NEGATIVE MG/DL
HCT VFR BLD AUTO: 35 % (ref 35–43)
HCV AB SERPL QL IA: NONREACTIVE
HGB BLD-MCNC: 12.4 G/DL (ref 11.5–14.5)
HGB UR QL STRIP: NEGATIVE MG/DL
HIV 1+2 AB+HIV1 P24 AG SERPL QL IA: NONREACTIVE
KETONES UR QL STRIP: NEGATIVE MG/DL
LEUKOCYTE ESTERASE UR QL STRIP: NEGATIVE /UL
LYMPHOCYTES # BLD AUTO: 1 THOU/CMM (ref 1–3)
LYMPHOCYTES NFR BLD AUTO: 38 %
MCH RBC QN AUTO: 31.3 PG (ref 26–34)
MCHC RBC AUTO-ENTMCNC: 35.4 G/DL (ref 32–37)
MCV RBC AUTO: 89 FL (ref 80–100)
MONOCYTES # BLD AUTO: 0.3 THOU/CMM (ref 0.3–1)
MONOCYTES NFR BLD AUTO: 13 %
MORPHOLOGY BLD-IMP: ABNORMAL
MUCOUS THREADS URNS QL MICRO: ABNORMAL
NEUTROPHILS # BLD AUTO: 1.2 THOU/CMM (ref 1.8–7.8)
NEUTROPHILS NFR BLD AUTO: 46 %
NITRITE UR QL STRIP: NEGATIVE
OVALOCYTES BLD QL SMEAR: ABNORMAL
PH UR: 5 [PH] (ref 4.5–8)
PLATELET # BLD AUTO: 197 THOU/CMM (ref 140–350)
PMV BLD REES-ECKER: 8.5 FL (ref 7.5–11.3)
POIKILOCYTOSIS BLD QL SMEAR: ABNORMAL
POLYCHROMASIA BLD QL SMEAR: ABNORMAL
POTASSIUM SERPL-SCNC: 4.2 MMOL/L (ref 3.5–5.2)
PROT 24H UR-MRATE: ABNORMAL MG/DL
PROT SERPL-MCNC: 6.5 G/DL (ref 6.3–8.3)
RBC # BLD AUTO: 3.95 MILL/CMM (ref 3.7–4.7)
RBC #/AREA URNS HPF: ABNORMAL /HPF (ref 0–2)
RHEUMATOID FACT SER QL LA: <10 IU/ML
SL AMB POCT URINE COMMENT: ABNORMAL
SODIUM SERPL-SCNC: 142 MMOL/L (ref 135–145)
SP GR UR: 1.03 (ref 1–1.03)
SQUAMOUS #/AREA URNS HPF: >10 /LPF (ref 0–5)
TSH SERPL-ACNC: 1.35 UIU/ML (ref 0.45–5.33)
WBC # BLD AUTO: 2.5 THOU/CMM (ref 4–10)
WBC #/AREA URNS HPF: ABNORMAL /HPF (ref 0–5)

## 2024-04-21 LAB
B BURGDOR AB SER-IMP: NORMAL
B BURGDOR IGG+IGM SER-ACNC: NEGATIVE INDEX
B BURGDOR IGM SER IA-ACNC: NEGATIVE INDEX
CCP IGA+IGG SERPL IA-ACNC: 11.2 RU/ML

## 2024-04-22 LAB
ANA NUCLEOLAR TITR SER: 80 TITER
ANA SER QL IF: PRESENT

## 2024-04-23 LAB
B19V IGG SER-ACNC: 8.77 IV
B19V IGM SER-ACNC: 22.54 IV
DSDNA IGG SERPL IA-ACNC: NEGATIVE TITER
ENA SCL70 AB SER-ACNC: NEGATIVE
ENA SM+RNP AB SER-ACNC: NEGATIVE
ENA SS-A AB SER-ACNC: <20 ELISA UNIT
ENA SS-B AB SER-ACNC: <20 ELISA UNIT
SL AMB SMITH ANTIBODIES: NEGATIVE

## 2024-05-01 DIAGNOSIS — G47.00 INSOMNIA, UNSPECIFIED TYPE: ICD-10-CM

## 2024-05-01 RX ORDER — ZOLPIDEM TARTRATE 10 MG/1
10 TABLET ORAL
Qty: 30 TABLET | Refills: 1 | Status: SHIPPED | OUTPATIENT
Start: 2024-05-01

## 2024-05-01 NOTE — TELEPHONE ENCOUNTER
zolpidem (AMBIEN) 10 mg tablet          Sig: Take 1 tablet (10 mg total) by mouth daily at bedtime as needed for sleep    Disp: 30 tablet    Refills: 1    Start: 5/1/2024    Class: Normal    Non-formulary For: Insomnia, unspecified type    Last ordered: 3 months ago (1/22/2024) by Luis Neil DO    Psychiatry:  [facility]xiol[facility]/Hypnotics Joahqt1005/01/2024 11:06 AM   Protocol Details This refill cannot be delegated    Valid encounter within last 6 months      To be filled at: [facility]/pharmacy #[id number] - [place], [place] - [address]

## 2024-05-01 NOTE — TELEPHONE ENCOUNTER
Reason for call:   [x] Refill   [] Prior Auth  [] Other:     Office:   [x] PCP/Provider -   [] Specialty/Provider -     Medication:       Does the patient have enough for 3 days?   [] Yes   [x] No - Send as HP to POD

## 2024-05-06 DIAGNOSIS — F32.A ANXIETY AND DEPRESSION: ICD-10-CM

## 2024-05-06 DIAGNOSIS — F41.9 ANXIETY AND DEPRESSION: ICD-10-CM

## 2024-05-06 RX ORDER — ESCITALOPRAM OXALATE 10 MG/1
10 TABLET ORAL DAILY
Qty: 90 TABLET | Refills: 2 | Status: SHIPPED | OUTPATIENT
Start: 2024-05-06

## 2024-06-27 ENCOUNTER — CONSULT (OUTPATIENT)
Dept: RHEUMATOLOGY | Facility: CLINIC | Age: 44
End: 2024-06-27
Payer: COMMERCIAL

## 2024-06-27 VITALS
DIASTOLIC BLOOD PRESSURE: 64 MMHG | WEIGHT: 133.4 LBS | BODY MASS INDEX: 22.23 KG/M2 | HEIGHT: 65 IN | HEART RATE: 68 BPM | SYSTOLIC BLOOD PRESSURE: 112 MMHG

## 2024-06-27 DIAGNOSIS — R76.8 POSITIVE ANTI-CCP TEST: ICD-10-CM

## 2024-06-27 DIAGNOSIS — M25.50 POLYARTHRALGIA: ICD-10-CM

## 2024-06-27 PROCEDURE — 99204 OFFICE O/P NEW MOD 45 MIN: CPT | Performed by: STUDENT IN AN ORGANIZED HEALTH CARE EDUCATION/TRAINING PROGRAM

## 2024-06-27 NOTE — ASSESSMENT & PLAN NOTE
Middling suspicion for an inflammatory rheumatologic arthritis. Not having the classic stiffness symptoms in the hands but has mildly elevated ACPA, achiness in the hands and feet and subjective swelling. Could potentially be a very early manifestation of RA. Will investigate further; if obvious signs of inflammatory arthritis then will have her come back sooner to discuss DMARD treatment, otherwise will have her follow up in a year or sooner PRN to see if symptoms have evolved in a more convincingly inflammatory way.

## 2024-06-27 NOTE — PATIENT INSTRUCTIONS
Please get x-rays    You should be contacted to set up an appointment for ultrasound. Please avoid antiinflammatories including NSAIDs and steroids for 3-5 days prior to the ultrasound.

## 2024-06-27 NOTE — PROGRESS NOTES
Ambulatory Visit  Name: Baljit Lew      : 1980      MRN: [id number]  Encounter Provider: Ry Hill DO  Encounter Date: 2024   Encounter department: [facility] RHEUMATOLOGY [facility]    Reason for referral: concern for RA    Assessment & Plan   1. Polyarthralgia  Assessment & Plan:  Middling suspicion for an inflammatory rheumatologic arthritis. Not having the classic stiffness symptoms in the hands but has mildly elevated ACPA, achiness in the hands and feet and subjective swelling. Could potentially be a very early manifestation of RA. Will investigate further; if obvious signs of inflammatory arthritis then will have her come back sooner to discuss DMARD treatment, otherwise will have her follow up in a year or sooner PRN to see if symptoms have evolved in a more convincingly inflammatory way.  Orders:  -     Ambulatory Referral to Rheumatology  -     XR hand 3+ vw left; Future; Expected date: 2024  -     XR hand 3+ vw right; Future; Expected date: 2024  -     XR foot 3+ vw left; Future; Expected date: 2024  -     XR foot 3+ vw right; Future; Expected date: 2024  2. 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 Not so much stiffness.    Swelling has improved, were swollen for about 3 weeks. Advil upset her stomach but did help with her joints.    Feet and hands are very achy, feels like she doesn't have the strength that she did prior to the infection. No morning stiffness. Feels like when she does have some mild swelling it's mostly around the knuckles.    Sometimes gets shooting pains, sometimes even just in the fingertips.    Denies:  Fever  Rash since Parvo resolved  Oral/nasal ulcers  Muscle weakness other than above  Uveitis  Dysphagia/odynophagia  CP  IZAGUIRRE  SOB at rest  Pleurisy  Stomach  "pain  Constipation/bloating  Hematochezia  Gross hematuria  Numbness/tingling other than above  Raynaud's  Joint issues other than noted above      Review of Systems    Objective     /64   Pulse 68   Ht 5' 5\" (1.651 m)   Wt 60.5 kg (133 lb 6.4 oz)   BMI 22.20 kg/m²      General: Well appearing, in no distress.   Eyes: Sclera non-icteric. EOMI  HENT: No oral ulcers. MMM.   Extremities: Warm, well perfused, no edema.   Neuro: Alert and oriented. No gross focal neurological deficits.   Skin: No rashes.  MSK exam: no tenderness to palpation or synovitis any joint or spine  Muscle strength   Right   Left    Shoulder abduction 5/5  Shoulder abduction 5/5   Shoulder adduction 5/5  Shoulder adduction 5/5   Shoulder internal rotation 5/5  Shoulder internal rotation 5/5   Shoulder external rotation 5/5  Shoulder external rotation 5/5   Elbow flexion 5/5  Elbow flexion 5/5   Elbow extension 5/5  Elbow extension 5/5   Wrist flexion 5/5  Wrist flexion 5/5   Wrist extension 5/5  Wrist extension 5/5   Hip flexion 5/5  Hip flexion 5/5   Hip extension 5/5  Hip extension 5/5   Hip abduction 5/5  Hip abduction 5/5   Hip adduction 5/5  Hip adduction 5/5   Knee flexion 5/5  Knee flexion 5/5   Knee extension 5/5  Knee extension 5/5   Dorsiflexion 5/5  Dorsiflexion 5/5   Plantarflexion 5/5  Plantarflexion 5/5          Neck   Flexion 5/5   Extension 5/5   R sidebending 5/5   L sidebending 5/5          Latest Reference Range & Units Most Recent   Creatinine 0.40 - 1.10 mg/dL 0.86 (E)  5/8/24 10:44   AST <41 U/L 12 (E)  5/8/24 10:44   ALT <56 U/L 8 (E)  5/8/24 10:44   ALK PHOS 35 - 120 U/L 30 (L) (E)  5/8/24 10:44   GFR, Calculated >59  86 (E)  5/8/24 10:44   dsDNA Ab <10 TITER Negative  4/20/24 09:21   RHEUMATOID FACTOR <15 IU/mL <10  4/20/24 09:21   SCLERODERMA (SCL-70) AB NEG  Negative  4/20/24 09:21   SSA (RO) ANTIBODY <20 MAURO Unit <20  4/20/24 09:21   SSB (LA) ANTIBODY <20 MAURO Unit <20  4/20/24 09:21   SARS-COV-2 NOT " DETECTED  NOT DETECTED (E)  9/13/21 13:46   HEP C AB NR  Nonreactive  4/20/24 09:21   LYME AB IGM NEG Index Negative  4/20/24 09:21   Lyme Ab IgM Interp., EIA  (Note)  4/20/24 09:21   Lyme IgG/IgM Ab NEG Index Negative  4/20/24 09:21   HIV AG/AB, 4TH GEN NR  Nonreactive  4/20/24 09:21   White Blood Cell Count 4.0 - 10.0 thou/cmm 2.5 (L)  4/20/24 09:21   Neutrophils % 46  4/20/24 09:21   Lymphocytes % 38  4/20/24 09:21   Monocytes % 13  4/20/24 09:21   Eosinophils % 2  4/20/24 09:21   Basophils PCT % 1  4/20/24 09:21   Neutrophils (Absolute) 1.8 - 7.8 thou/cmm 1.2 (L)  4/20/24 09:21   Lymphocytes (Absolute) 1.0 - 3.0 thou/cmm 1.0  4/20/24 09:21   Monocytes (Absolute) 0.3 - 1.0 thou/cmm 0.3  4/20/24 09:21   Eosinophils (Absolute) 0.0 - 0.5 thou/cmm 0.1  4/20/24 09:21   Basophils ABS 0.0 - 0.1 thou/cmm 0.0  4/20/24 09:21   SL AMB PARVOVIRUS B19 IGG ANTIBODY IV 8.77 (H)  4/20/24 09:21   SL AMB PARVOVIRUS B19 IGM ANTIBODY IV 22.54 (H)  4/20/24 09:21   (L): Data is abnormally low  (H): Data is abnormally high  (E): External lab result    Physical Exam  Administrative Statements

## 2024-07-08 DIAGNOSIS — G47.00 INSOMNIA, UNSPECIFIED TYPE: ICD-10-CM

## 2024-07-08 RX ORDER — ZOLPIDEM TARTRATE 10 MG/1
10 TABLET ORAL
Qty: 30 TABLET | Refills: 2 | Status: SHIPPED | OUTPATIENT
Start: 2024-07-08

## 2024-07-08 NOTE — TELEPHONE ENCOUNTER
zolpidem (AMBIEN) 10 mg tablet          Sig: Take 1 tablet (10 mg total) by mouth daily at bedtime as needed for sleep    Disp: 30 tablet    Refills: 0    Start: 7/8/2024    Class: Normal    PDMP Review May Be Needed    Non-formulary For: Insomnia, unspecified type    Last ordered: 2 months ago (5/1/2024) by Samantha Pickard DO    Psychiatry:  Anxiolytics/Hypnotics Dbkrcl3807/08/2024 11:17 AM   Protocol Details This refill cannot be delegated    Valid encounter within last 6 months      To be filled at: [facility]/pharmacy #[id number] - [place] - [address]

## 2024-07-08 NOTE — TELEPHONE ENCOUNTER
Reason for call:   [x] Refill   [] Prior Auth  [] Other:     Office:   [x] PCP/Provider - Rashaad  [] Specialty/Provider -     Medication: Zolpidem 10mg    Dose/Frequency: 1 tab hs    Quantity: 30    Pharmacy: [facility]/pharmacy #[id number] - [place] - [address] [phone]     Does the patient have enough for 3 days?   [] Yes   [x] No - pt has no meds

## 2024-10-25 DIAGNOSIS — G47.00 INSOMNIA, UNSPECIFIED TYPE: ICD-10-CM

## 2024-10-25 RX ORDER — ZOLPIDEM TARTRATE 10 MG/1
10 TABLET ORAL
Qty: 30 TABLET | Refills: 0 | Status: SHIPPED | OUTPATIENT
Start: 2024-10-25

## 2024-10-25 NOTE — TELEPHONE ENCOUNTER
Reason for call:   [x] Refill   [] Prior Auth  [] Other:     Office:   [x] PCP/Provider - [facility]  [] Specialty/Provider -     Medication: zolpidem (AMBIEN) 10 mg tablet     Dose/Frequency: 10 mg, Daily at bedtime PRN     Quantity: 30    Pharmacy: [facility] #[id number]    Does the patient have enough for 3 days?   [x] Yes   [] No - Send as HP to POD

## 2024-11-25 DIAGNOSIS — G47.00 INSOMNIA, UNSPECIFIED TYPE: ICD-10-CM

## 2024-11-25 RX ORDER — ZOLPIDEM TARTRATE 10 MG/1
10 TABLET ORAL
Qty: 30 TABLET | Refills: 0 | Status: SHIPPED | OUTPATIENT
Start: 2024-11-25

## 2024-11-25 NOTE — TELEPHONE ENCOUNTER
Reason for call:   [x] Refill   [] Prior Auth  [] Other:     Office:   [x] PCP/Provider - Luis Neil/Charanjit Zuleta  [] Specialty/Provider -     Medication: Ambien    Dose/Frequency: 10 mg     Quantity: #30    Pharmacy: [place] [id number]    Does the patient have enough for 3 days?   [] Yes   [x] No - Send as HP to POD

## 2024-12-30 DIAGNOSIS — G47.00 INSOMNIA, UNSPECIFIED TYPE: ICD-10-CM

## 2024-12-30 NOTE — TELEPHONE ENCOUNTER
Patient wondering if she can obtain a 60-90 day supply.     Reason for call:   [x] Refill   [] Prior Auth  [] Other:     Office:   [x] PCP/Provider - Luis Neil DO   [] Specialty/Provider -     Medication: zolpidem (AMBIEN) 10 mg tablet / Take 1 tablet (10 mg total) by mouth daily at bedtime as needed for sleep    Pharmacy: [facility]/pharmacy #[id number] [facility] [place] - [address]     Does the patient have enough for 3 days?   [] Yes   [x] No - Send as HP to POD

## 2024-12-31 RX ORDER — ZOLPIDEM TARTRATE 10 MG/1
10 TABLET ORAL
Qty: 30 TABLET | Refills: 0 | Status: SHIPPED | OUTPATIENT
Start: 2024-12-31

## 2025-02-03 DIAGNOSIS — G47.00 INSOMNIA, UNSPECIFIED TYPE: ICD-10-CM

## 2025-02-03 RX ORDER — ZOLPIDEM TARTRATE 10 MG/1
10 TABLET ORAL
Qty: 30 TABLET | Refills: 0 | Status: SHIPPED | OUTPATIENT
Start: 2025-02-03

## 2025-02-03 NOTE — TELEPHONE ENCOUNTER
Reason for call:   [x] Refill   [] Prior Auth  [] Other:     Office:   [x] PCP/Provider - Luis Neil DO   [] Specialty/Provider -     Medication: zolpidem (AMBIEN) 10 mg tablet    Dose/Frequency: Take 1 tablet (10 mg total) by mouth daily at bedtime as needed for sleep     Quantity: 30    Pharmacy: [facility]/pharmacy #[id number] [place], [place] - [address]     Does the patient have enough for 3 days?   [] Yes   [x] No - Send as HP to POD

## 2025-02-03 NOTE — TELEPHONE ENCOUNTER
zolpidem (AMBIEN) 10 mg tablet         Sig: Take 1 tablet (10 mg total) by mouth daily at bedtime as needed for sleep    Disp: 30 tablet    Refills: 0    Start: 2/3/2025    Class: Normal    PDMP Review May Be Needed    For: Insomnia, unspecified type    Last ordered: 1 month ago (12/31/2024) by Luis Neil DO    Psychiatry:  Anxiolytics/Hypnotics Cmrayg9202/03/2025 10:28 AM   Protocol Details This refill cannot be delegated    Valid encounter within last 6 months      [id number] [address]

## 2025-03-03 DIAGNOSIS — G47.00 INSOMNIA, UNSPECIFIED TYPE: ICD-10-CM

## 2025-03-03 RX ORDER — ZOLPIDEM TARTRATE 10 MG/1
10 TABLET ORAL
Qty: 30 TABLET | Refills: 0 | Status: SHIPPED | OUTPATIENT
Start: 2025-03-03

## 2025-03-03 NOTE — TELEPHONE ENCOUNTER
Reason for call:   [x] Refill   [] Prior Auth  [] Other:     Office:   [x] PCP/Provider - Dr Neil   [] Specialty/Provider -     Medication: zolpidem     Dose/Frequency: 10 mg take daily at bedtime as needed    Quantity: 30    [facility] [address]    Does the patient have enough for 3 days?   [] Yes   [x] No - Send as HP to POD

## 2025-03-03 NOTE — TELEPHONE ENCOUNTER
zolpidem (AMBIEN) 10 mg tablet         Sig: Take 1 tablet (10 mg total) by mouth daily at bedtime as needed for sleep    Disp: 30 tablet    Refills: 0    Start: 3/3/2025    Class: Normal    PDMP Review May Be Needed    For: Insomnia, unspecified type    Last ordered: 4 weeks ago (2/3/2025) by Luis Neil DO    Psychiatry:  Anxiolytics/Hypnotics Hzyypb5703/03/2025 09:32 AM   Protocol Details This refill cannot be delegated    Valid encounter within last 6 months      To be filled at: [facility][id number] [facility] [address]

## 2025-04-04 DIAGNOSIS — G47.00 INSOMNIA, UNSPECIFIED TYPE: ICD-10-CM

## 2025-04-04 RX ORDER — ZOLPIDEM TARTRATE 10 MG/1
10 TABLET ORAL
Qty: 30 TABLET | Refills: 0 | Status: SHIPPED | OUTPATIENT
Start: 2025-04-04

## 2025-04-04 NOTE — TELEPHONE ENCOUNTER
Reason for call:   [x] Refill   [] Prior Auth  [] Other:     Office:   [x] PCP/Provider - Dr Neil   [] Specialty/Provider -     Medication: zolpidem     Dose/Frequency: 10 mg take daily at bedtime as needed     Quantity: 30    [facility] [address]     Local Pharmacy   Does the patient have enough for 3 days?   [] Yes   [x] No - Send as HP to POD    Mail Away Pharmacy   Does the patient have enough for 10 days?   [] Yes   [] No - Send as HP to POD

## 2025-05-07 DIAGNOSIS — G47.00 INSOMNIA, UNSPECIFIED TYPE: ICD-10-CM

## 2025-05-07 NOTE — TELEPHONE ENCOUNTER
Reason for call:   [x] Refill   [] Prior Auth  [] Other:     Office:   [x] PCP/Provider -   [] Specialty/Provider -     Medication: zolpidem (AMBIEN) 10 mg tablet     Dose/Frequency: Take 1 tablet (10 mg total) by mouth daily at bedtime as needed for sleep     Quantity: 30    Pharmacy: [facility] [place]     Local Pharmacy   Does the patient have enough for 3 days?   [x] Yes   [] No - Send as HP to POD

## 2025-05-08 RX ORDER — ZOLPIDEM TARTRATE 10 MG/1
10 TABLET ORAL
Qty: 30 TABLET | Refills: 0 | Status: SHIPPED | OUTPATIENT
Start: 2025-05-08

## (undated) DEVICE — UNDYED MONOFILAMENT (POLYDIOXANONE), ABSORBABLE SURGICAL SUTURE: Brand: PDS

## (undated) DEVICE — SPONGE STICK WITH PVP-I: Brand: KENDALL

## (undated) DEVICE — 1820 FOAM BLOCK NEEDLE COUNTER: Brand: DEVON

## (undated) DEVICE — SUT PROLENE 2-0 SH 30 IN 8833H

## (undated) DEVICE — TUBING SUCTION 5MM X 12 FT

## (undated) DEVICE — SCD SEQUENTIAL COMPRESSION COMFORT SLEEVE MEDIUM KNEE LENGTH: Brand: KENDALL SCD

## (undated) DEVICE — ELECTRODE BLADE MOD E-Z CLEAN 2.5IN 6.4CM -0012M

## (undated) DEVICE — STANDARD SURGICAL GOWN, L: Brand: CONVERTORS

## (undated) DEVICE — SUT VICRYL 2-0 J459H

## (undated) DEVICE — INTENDED FOR TISSUE SEPARATION, AND OTHER PROCEDURES THAT REQUIRE A SHARP SURGICAL BLADE TO PUNCTURE OR CUT.: Brand: BARD-PARKER ® SAFETYLOCK CARBON RIB-BACK BLADES

## (undated) DEVICE — SUT PROLENE 0 CT-1 30 IN 8424H

## (undated) DEVICE — NEEDLE BLUNT 18 G X 1 1/2IN

## (undated) DEVICE — CHEST/BREAST DRAPE: Brand: CONVERTORS

## (undated) DEVICE — SMOKE EVACUATION TUBING WITH 7/8" HOSE TO HOSE CONNECTOR: Brand: BUFFALO FILTER

## (undated) DEVICE — SINGLE PORT MANIFOLD: Brand: NEPTUNE 2

## (undated) DEVICE — SPONGE LAP 18 X 18 IN STRL RFD

## (undated) DEVICE — SUTURE STRATAFIX 1 45CM

## (undated) DEVICE — SKIN MARKER DUAL TIP WITH RULER CAP, FLEXIBLE RULER AND LABELS: Brand: DEVON

## (undated) DEVICE — LIGHT GLOVE GREEN

## (undated) DEVICE — BASIC PACK: Brand: CONVERTORS

## (undated) DEVICE — SUT PDS II 2-0 CT-1 27 IN Z339H

## (undated) DEVICE — ADHESIVE SKIN HIGH VISCOSITY EXOFIN 1ML

## (undated) DEVICE — SUT VICRYL 2-0 CT-1 27 IN J259H

## (undated) DEVICE — SPONGE 4 X 4 XRAY 16 PLY STRL LF RFD

## (undated) DEVICE — BULB SYRINGE,IRRIGATION WITH PROTECTIVE CAP: Brand: DOVER

## (undated) DEVICE — DISPOSABLE OR TOWEL: Brand: CARDINAL HEALTH

## (undated) DEVICE — STERILE POLYISOPRENE POWDER-FREE SURGICAL GLOVES: Brand: PROTEXIS

## (undated) DEVICE — NEEDLE 25G X 1 1/2

## (undated) DEVICE — POV-IOD SOLUTION 4OZ BT

## (undated) DEVICE — SYRINGE 10ML LL

## (undated) DEVICE — PENCIL SMOKE EVAC TELESCOPING W/TUBING

## (undated) DEVICE — SYRINGE 30ML LL

## (undated) DEVICE — ASTOUND STANDARD SURGICAL GOWN, XXL: Brand: CONVERTORS

## (undated) DEVICE — ADHESIVE SKIN HIGH VISCOSITY EXOFIN MICRO 0.5ML

## (undated) DEVICE — SUT CHROMIC 5-0 P-3 18 IN 687G

## (undated) DEVICE — DRAIN HUBLESS 15FR 3 1/16IN

## (undated) DEVICE — CHLORAPREP HI-LITE 26ML ORANGE

## (undated) DEVICE — ELECTRODE BLADE MOD  E-Z CLEAN 6.5IN -0014M

## (undated) DEVICE — SUT PROLENE 1 CT-1 30 IN 8425H

## (undated) DEVICE — JACKSON-PRATT 100CC BULB RESERVOIR: Brand: CARDINAL HEALTH

## (undated) DEVICE — MINOR PROCEDURE DRAPE: Brand: CONVERTORS

## (undated) DEVICE — SUT MONOCRYL 4-0 P-3 18 IN Y494G

## (undated) DEVICE — ABDOMINAL PAD: Brand: DERMACEA

## (undated) DEVICE — PENCIL ELECTROSURG E-Z CLEAN -0035H

## (undated) DEVICE — PAD GROUNDING ADULT

## (undated) DEVICE — SUT MONOCRYL 3-0 PS-2 27 IN Y427H

## (undated) DEVICE — SYRINGE 50ML LL

## (undated) DEVICE — GLOVE SRG LF STRL BGL SKNSNS 6.5 PF

## (undated) DEVICE — SYRINGE 10ML LL CONTROL TOP